# Patient Record
Sex: FEMALE | Race: WHITE | NOT HISPANIC OR LATINO | Employment: STUDENT | URBAN - NONMETROPOLITAN AREA
[De-identification: names, ages, dates, MRNs, and addresses within clinical notes are randomized per-mention and may not be internally consistent; named-entity substitution may affect disease eponyms.]

---
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
## 2023-01-13 ENCOUNTER — E-VISIT (OUTPATIENT)
Dept: URGENT CARE | Facility: CLINIC | Age: 18
End: 2023-01-13
Payer: COMMERCIAL

## 2023-01-13 DIAGNOSIS — N89.8 VAGINAL DISCHARGE: Primary | ICD-10-CM

## 2023-01-13 PROCEDURE — 99207 PR NON-BILLABLE SERV PER CHARTING: CPT | Performed by: NURSE PRACTITIONER

## 2023-01-14 NOTE — PATIENT INSTRUCTIONS
Thank you for choosing us for your care. I think an in-clinic visit would be best next steps based on your symptoms. Please schedule a clinic appointment; you won t be charged for this eVisit.      You can schedule an appointment right here in [place], or call [phone]      Please see your GYN for bleeding with intercourse.

## 2023-01-26 ENCOUNTER — HOSPITAL ENCOUNTER (OUTPATIENT)
Dept: PHYSICAL THERAPY | Facility: OTHER | Age: 18
Setting detail: THERAPIES SERIES
Discharge: HOME | End: 2023-01-26
Attending: FAMILY MEDICINE
Payer: COMMERCIAL

## 2023-01-26 DIAGNOSIS — M70.72 ILIOPSOAS BURSITIS OF LEFT HIP: ICD-10-CM

## 2023-01-26 DIAGNOSIS — M67.952 TENDINOPATHY OF LEFT GLUTEUS MEDIUS: ICD-10-CM

## 2023-01-26 DIAGNOSIS — M47.818 ARTHROPATHY OF LEFT SACROILIAC JOINT: ICD-10-CM

## 2023-01-26 DIAGNOSIS — M76.892 TENDINITIS OF LEFT HIP FLEXOR: ICD-10-CM

## 2023-01-26 PROCEDURE — 97110 THERAPEUTIC EXERCISES: CPT | Mod: GP,PN

## 2023-01-26 PROCEDURE — 97161 PT EVAL LOW COMPLEX 20 MIN: CPT | Mod: GP,PN

## 2023-01-26 PROCEDURE — 97140 MANUAL THERAPY 1/> REGIONS: CPT | Mod: GP,PN

## 2023-01-27 NOTE — PROGRESS NOTES
"   01/26/23 1000   General Information   Type of Visit Initial OP Ortho PT Evaluation   Start of Care Date 01/26/23   Referring Physician Dr. José Luis Tyson   Orders Evaluate and Treat   Date of Order 12/14/22   Certification Required? No   Medical Diagnosis Left hip pain, L SI Joint dysfunction   Surgical/Medical history reviewed Yes   Precautions/Limitations no known precautions/limitations   Special Instructions None   Body Part(s)   Body Part(s) Hip   Presentation and Etiology   Pertinent history of current problem (include personal factors and/or comorbidities that impact the POC) Patient is a 18 y/o female whom presents to PT with c/o chronic left lateral hip pain. Reports her left hip pain has actually increased since her appointment with Dr. Tyson. Reports almost constant \"aching\".Reports episodes of sharper lateral to anterior hip pain. Rare cramping left posterior hip. No recent choropractic treatment. She works 40 hours per week as a  standing on hard floors. her hip is always more painful after her shifts. She is generally quite active and is also a full time college student. No other significant past medical history.   Impairments A. Pain;F. Decreased strength and endurance   Functional Limitations perform required work activities;perform desired leisure / sports activities   Symptom Location Left lateral to anterior hip   How/Where did it occur From insidious onset   Onset date of current episode/exacerbation 12/14/22  (Date of referral)   Chronicity Chronic   Pain rating (0-10 point scale) Best (/10);Worst (/10)   Best (/10) 1   Worst (/10) 8   Pain quality C. Aching;E. Shooting   Frequency of pain/symptoms A. Constant   Pain/symptoms are: Worse during the day   Pain/symptoms exacerbated by L. Work tasks   Pain/symptoms eased by C. Rest;I. OTC medication(s);H. Cold  (Tylenol)   Progression of symptoms since onset: Worsened   Prior Level of Function   Prior Level of Function-Mobility NML   Prior " Level of Function-ADLs NML   Current Level of Function   Current Community Support Family/friend caregiver   Patient role/employment history A. Employed;B. Student   Living environment House/townhome   Home/community accessibility Normal   Fall Risk Screen   Fall screen completed by PT   Have you fallen 2 or more times in the past year? No   Have you fallen and had an injury in the past year? No   Is patient a fall risk? No   Abuse Screen (yes response referral indicated)   Feels Unsafe at Home or Work/School no   Feels Threatened by Someone no   Does Anyone Try to Keep You From Having Contact with Others or Doing Things Outside Your Home? no   Physical Signs of Abuse Present no   Hip Objective Findings   Gait/Locomotion Appears to be nonantalgic and without impairment   Balance/Proprioception (Single Leg Stance) Good bilaterally   Side (if bilateral, select both right and left) Left   Hip Flexibility Comments Generally good bilaterally   Hip ROM Comments Hip range of motion is generally without pain with exception of hip flexion above the 100 degree level and at end range of internal rotation.  Both of these motions cause discomfort in the anterior hip   Lumbar ROM Normal   Pelvic Screen Good alignment is seen   Functional Closed Chain Screen Patient is able to complete full squat test without pain   Hip/Knee Strength Comments Generally excellent strength in the lower extremities bilaterally   Femoral Nerve Stretch Test Negative   Gluteal Tendopathy Test Positive left   Resisted Hip Adduction Test Negative   Straight Leg Raise Test NEG   Scour Test NEG   BLAS Test Mild POS   FADIR Test Mild POS   Hip Special Tests Comments Mild positive results of tests included in the gluteal tendinopathy complex   Palpation 2/4 tenderness of left glute med and IT band.   Observation No observed deformity   Integumentary  Normal   Posture Normal   Reginald Flexibility Test Normal bilaterally   Obers/ITB Flexibility Slight  discomfort along the IT band and lateral hip on the left   Left Hamstring Flexibility Generally good and equal bilaterally   Left Piriformis Flexibility Good range of motion with minimal to moderate discomfort with full endrange stretch on the left   Left Hip Flexion PROM 110   Left Hip Abduction PROM 44   Left Hip Adduction PROM 38   Left Hip ER PROM 42   Left Hip IR PROM 37   Left Hip Ext PROM 12   Left Hip Flexion Strength 4+ left 5 on the right   Left Hip Abduction Strength 5/5 bilaterally   Left Hip Extension Strength 5/5 bilaterally   Left Hip IR Strength 5/5 bilaterally   Left Hip ER Strength 4+ left, 5/5 right   Left Knee Flexion Strength 5/5 bilaterally   Left Knee Extension Strength 5/5 bilaterally   Planned Therapy Interventions   Planned Therapy Interventions joint mobilization;manual therapy;ROM;strengthening;stretching   Planned Modality Interventions   Planned Modality Interventions Cryotherapy;Hot packs;Ultrasound   Planned Modality Interventions Comments Modalities will be used as an adjunct to the intended course of exercise based on manual physical therapy treatment   Clinical Impression   Criteria for Skilled Therapeutic Interventions Met yes, treatment indicated   PT Diagnosis Left hip and gluteal tendinopathy   Influenced by the following impairments Left hip pain, intermittent episodes of left lower extremity weakness and limited range of motion   Functional limitations due to impairments Difficulty with activities which require longer duration standing and/or walking including working for up to 40 hours/week.  At times difficulty laying on the left side.  Reduced endurance for normal daily activities due to left hip pain   Clinical Presentation Stable/Uncomplicated   Clinical Decision Making (Complexity) Low complexity   Therapy Frequency 2 times/Week  (Will decrease to 1 time per week then transition completely to an independent home exercise program as patient's left hip pain resolves)    Predicted Duration of Therapy Intervention (days/wks) 8 weeks   Risk & Benefits of therapy have been explained Yes   Patient, Family & other staff in agreement with plan of care Yes   Clinical Impression Comments Patient generally demonstrates good lower extremity strength and essentially normal range of motion   Education Assessment   Preferred Learning Style Listening;Reading;Demonstration;Pictures/video   Barriers to Learning No barriers   ORTHO GOALS   PT Ortho Eval Goals 1;2;3   Ortho Goal 1   Goal Identifier Pain   Goal Description Patient will report she is able to complete 8 hour/day work shifts up to 40 hours/week without limitation due to left hip pain in excess of 2/10 at any time.  Patient will be able to complete other recreational and desired pursuits including longer duration walking etc. as desired without limitation due to left hip pain in 8-12 weeks   Target Date 04/12/23   Ortho Goal 2   Goal Identifier Strength   Goal Description Patient will demonstrate grade 5/5 MMT strength throughout the entire left hip knee and ankle muscle group.  This will support her ability to complete all normal job tasks up to 40 hours/week 8 hours/day without limitation.  She will demo good stability throughout the hip complex for all activities including walking and other recreational pursuits as desired and 8 to 12 weeks   Target Date 04/12/23   Ortho Goal 3   Goal Identifier Mobility   Goal Description Patient will demonstrate full left hip mobility in all positions without limitation to endrange discomfort.  This will support the mobility required to complete all job tasks including lifting carrying weights up to 20 to 30 pounds, bending into squat positions and lifting stocking shelves and other tasks as required at her job and at home in 8 to 12 weeks   Target Date 04/12/23   Total Evaluation Time   PT Eval, Low Complexity Minutes (29184) 35

## 2023-01-31 ENCOUNTER — HOSPITAL ENCOUNTER (OUTPATIENT)
Dept: PHYSICAL THERAPY | Facility: OTHER | Age: 18
Setting detail: THERAPIES SERIES
Discharge: HOME | End: 2023-01-31
Attending: FAMILY MEDICINE
Payer: COMMERCIAL

## 2023-01-31 PROCEDURE — 97140 MANUAL THERAPY 1/> REGIONS: CPT | Mod: GP,PN

## 2023-01-31 PROCEDURE — 97110 THERAPEUTIC EXERCISES: CPT | Mod: GP,PN

## 2023-02-02 ENCOUNTER — HOSPITAL ENCOUNTER (OUTPATIENT)
Dept: PHYSICAL THERAPY | Facility: OTHER | Age: 18
Setting detail: THERAPIES SERIES
Discharge: HOME | End: 2023-02-02
Attending: FAMILY MEDICINE
Payer: COMMERCIAL

## 2023-02-02 PROCEDURE — 97140 MANUAL THERAPY 1/> REGIONS: CPT | Mod: GP,PN

## 2023-02-02 PROCEDURE — 97110 THERAPEUTIC EXERCISES: CPT | Mod: GP,PN

## 2023-02-07 ENCOUNTER — HOSPITAL ENCOUNTER (OUTPATIENT)
Dept: PHYSICAL THERAPY | Facility: OTHER | Age: 18
Setting detail: THERAPIES SERIES
Discharge: HOME | End: 2023-02-07
Attending: FAMILY MEDICINE
Payer: COMMERCIAL

## 2023-02-07 PROCEDURE — 97110 THERAPEUTIC EXERCISES: CPT | Mod: GP

## 2023-02-07 PROCEDURE — 97140 MANUAL THERAPY 1/> REGIONS: CPT | Mod: GP

## 2023-02-09 ENCOUNTER — HOSPITAL ENCOUNTER (OUTPATIENT)
Dept: PHYSICAL THERAPY | Facility: OTHER | Age: 18
Setting detail: THERAPIES SERIES
Discharge: HOME | End: 2023-02-09
Attending: FAMILY MEDICINE
Payer: COMMERCIAL

## 2023-02-09 PROCEDURE — 97110 THERAPEUTIC EXERCISES: CPT | Mod: GP,PN

## 2023-02-09 PROCEDURE — 97140 MANUAL THERAPY 1/> REGIONS: CPT | Mod: GP,PN

## 2023-02-20 ENCOUNTER — HOSPITAL ENCOUNTER (OUTPATIENT)
Dept: PHYSICAL THERAPY | Facility: OTHER | Age: 18
Setting detail: THERAPIES SERIES
Discharge: HOME | End: 2023-02-20
Attending: FAMILY MEDICINE
Payer: COMMERCIAL

## 2023-02-20 PROCEDURE — 97140 MANUAL THERAPY 1/> REGIONS: CPT | Mod: GP,PO

## 2023-02-24 ENCOUNTER — HOSPITAL ENCOUNTER (OUTPATIENT)
Dept: PHYSICAL THERAPY | Facility: OTHER | Age: 18
Setting detail: THERAPIES SERIES
Discharge: HOME | End: 2023-02-24
Attending: FAMILY MEDICINE
Payer: COMMERCIAL

## 2023-02-24 PROCEDURE — 97140 MANUAL THERAPY 1/> REGIONS: CPT | Mod: GP,PO

## 2023-02-24 PROCEDURE — 97110 THERAPEUTIC EXERCISES: CPT | Mod: GP,PO

## 2023-03-08 ENCOUNTER — OFFICE VISIT (OUTPATIENT)
Dept: FAMILY MEDICINE | Facility: OTHER | Age: 18
End: 2023-03-08
Attending: PHYSICIAN ASSISTANT
Payer: COMMERCIAL

## 2023-03-08 VITALS
WEIGHT: 117 LBS | TEMPERATURE: 97.7 F | DIASTOLIC BLOOD PRESSURE: 72 MMHG | RESPIRATION RATE: 16 BRPM | BODY MASS INDEX: 23.83 KG/M2 | OXYGEN SATURATION: 99 % | HEART RATE: 80 BPM | SYSTOLIC BLOOD PRESSURE: 100 MMHG

## 2023-03-08 DIAGNOSIS — N89.8 VAGINAL DISCHARGE: ICD-10-CM

## 2023-03-08 DIAGNOSIS — Z11.3 SCREEN FOR STD (SEXUALLY TRANSMITTED DISEASE): ICD-10-CM

## 2023-03-08 DIAGNOSIS — A74.9 CHLAMYDIA INFECTION: ICD-10-CM

## 2023-03-08 DIAGNOSIS — N94.10 PAIN IN FEMALE GENITALIA ON INTERCOURSE: ICD-10-CM

## 2023-03-08 DIAGNOSIS — R30.0 DYSURIA: Primary | ICD-10-CM

## 2023-03-08 DIAGNOSIS — B37.31 YEAST INFECTION OF THE VAGINA: Primary | ICD-10-CM

## 2023-03-08 DIAGNOSIS — R10.84 ABDOMINAL PAIN, GENERALIZED: ICD-10-CM

## 2023-03-08 DIAGNOSIS — Z11.4 SCREENING FOR HIV (HUMAN IMMUNODEFICIENCY VIRUS): ICD-10-CM

## 2023-03-08 DIAGNOSIS — Z11.59 ENCOUNTER FOR HEPATITIS C SCREENING TEST FOR LOW RISK PATIENT: ICD-10-CM

## 2023-03-08 DIAGNOSIS — R11.0 NAUSEA: ICD-10-CM

## 2023-03-08 LAB
ALBUMIN SERPL BCG-MCNC: 4.8 G/DL (ref 3.5–5.2)
ALBUMIN UR-MCNC: NEGATIVE MG/DL
ALP SERPL-CCNC: 66 U/L (ref 35–104)
ALT SERPL W P-5'-P-CCNC: 18 U/L (ref 10–35)
ANION GAP SERPL CALCULATED.3IONS-SCNC: 9 MMOL/L (ref 7–15)
APPEARANCE UR: CLEAR
AST SERPL W P-5'-P-CCNC: 14 U/L (ref 10–35)
BASOPHILS # BLD AUTO: 0 10E3/UL (ref 0–0.2)
BASOPHILS NFR BLD AUTO: 1 %
BILIRUB SERPL-MCNC: 0.3 MG/DL
BILIRUB UR QL STRIP: NEGATIVE
BUN SERPL-MCNC: 12.3 MG/DL (ref 6–20)
C TRACH DNA SPEC QL PROBE+SIG AMP: POSITIVE
CALCIUM SERPL-MCNC: 9.9 MG/DL (ref 8.6–10)
CHLORIDE SERPL-SCNC: 106 MMOL/L (ref 98–107)
CLUE CELLS: ABNORMAL
COLOR UR AUTO: NORMAL
CREAT SERPL-MCNC: 0.78 MG/DL (ref 0.51–0.95)
DEPRECATED HCO3 PLAS-SCNC: 30 MMOL/L (ref 22–29)
EOSINOPHIL # BLD AUTO: 0.1 10E3/UL (ref 0–0.7)
EOSINOPHIL NFR BLD AUTO: 2 %
ERYTHROCYTE [DISTWIDTH] IN BLOOD BY AUTOMATED COUNT: 12.6 % (ref 10–15)
GFR SERPL CREATININE-BSD FRML MDRD: >90 ML/MIN/1.73M2
GLUCOSE SERPL-MCNC: 87 MG/DL (ref 70–99)
GLUCOSE UR STRIP-MCNC: NEGATIVE MG/DL
HCG UR QL: NEGATIVE
HCT VFR BLD AUTO: 41.2 % (ref 35–47)
HGB BLD-MCNC: 14.5 G/DL (ref 11.7–15.7)
HGB UR QL STRIP: NEGATIVE
IMM GRANULOCYTES # BLD: 0 10E3/UL
IMM GRANULOCYTES NFR BLD: 0 %
KETONES UR STRIP-MCNC: NEGATIVE MG/DL
LEUKOCYTE ESTERASE UR QL STRIP: NEGATIVE
LYMPHOCYTES # BLD AUTO: 1.6 10E3/UL (ref 0.8–5.3)
LYMPHOCYTES NFR BLD AUTO: 21 %
MCH RBC QN AUTO: 31.2 PG (ref 26.5–33)
MCHC RBC AUTO-ENTMCNC: 35.2 G/DL (ref 31.5–36.5)
MCV RBC AUTO: 89 FL (ref 78–100)
MONOCYTES # BLD AUTO: 0.4 10E3/UL (ref 0–1.3)
MONOCYTES NFR BLD AUTO: 5 %
N GONORRHOEA DNA SPEC QL NAA+PROBE: NEGATIVE
NEUTROPHILS # BLD AUTO: 5.6 10E3/UL (ref 1.6–8.3)
NEUTROPHILS NFR BLD AUTO: 71 %
NITRATE UR QL: NEGATIVE
NRBC # BLD AUTO: 0 10E3/UL
NRBC BLD AUTO-RTO: 0 /100
PH UR STRIP: 6.5 [PH] (ref 5–9)
PLATELET # BLD AUTO: 304 10E3/UL (ref 150–450)
POTASSIUM SERPL-SCNC: 4.1 MMOL/L (ref 3.4–5.3)
PROT SERPL-MCNC: 7.6 G/DL (ref 6.4–8.3)
RBC # BLD AUTO: 4.65 10E6/UL (ref 3.8–5.2)
SODIUM SERPL-SCNC: 145 MMOL/L (ref 136–145)
SP GR UR STRIP: 1.02 (ref 1–1.03)
TRICHOMONAS, WET PREP: ABNORMAL
UROBILINOGEN UR STRIP-MCNC: NORMAL MG/DL
WBC # BLD AUTO: 7.9 10E3/UL (ref 4–11)
WBC'S/HIGH POWER FIELD, WET PREP: ABNORMAL
YEAST, WET PREP: PRESENT

## 2023-03-08 PROCEDURE — 87591 N.GONORRHOEAE DNA AMP PROB: CPT | Mod: ZL | Performed by: PHYSICIAN ASSISTANT

## 2023-03-08 PROCEDURE — 81025 URINE PREGNANCY TEST: CPT | Mod: ZL | Performed by: PHYSICIAN ASSISTANT

## 2023-03-08 PROCEDURE — 86803 HEPATITIS C AB TEST: CPT | Mod: ZL | Performed by: PHYSICIAN ASSISTANT

## 2023-03-08 PROCEDURE — 81003 URINALYSIS AUTO W/O SCOPE: CPT | Mod: ZL | Performed by: PHYSICIAN ASSISTANT

## 2023-03-08 PROCEDURE — 87491 CHLMYD TRACH DNA AMP PROBE: CPT | Mod: ZL | Performed by: PHYSICIAN ASSISTANT

## 2023-03-08 PROCEDURE — 87210 SMEAR WET MOUNT SALINE/INK: CPT | Mod: ZL | Performed by: PHYSICIAN ASSISTANT

## 2023-03-08 PROCEDURE — 85041 AUTOMATED RBC COUNT: CPT | Mod: ZL | Performed by: PHYSICIAN ASSISTANT

## 2023-03-08 PROCEDURE — 36415 COLL VENOUS BLD VENIPUNCTURE: CPT | Mod: ZL | Performed by: PHYSICIAN ASSISTANT

## 2023-03-08 PROCEDURE — 80053 COMPREHEN METABOLIC PANEL: CPT | Mod: ZL | Performed by: PHYSICIAN ASSISTANT

## 2023-03-08 PROCEDURE — 99214 OFFICE O/P EST MOD 30 MIN: CPT | Performed by: PHYSICIAN ASSISTANT

## 2023-03-08 PROCEDURE — 87389 HIV-1 AG W/HIV-1&-2 AB AG IA: CPT | Mod: ZL | Performed by: PHYSICIAN ASSISTANT

## 2023-03-08 RX ORDER — AZITHROMYCIN 500 MG/1
1000 TABLET, FILM COATED ORAL DAILY
Qty: 2 TABLET | Refills: 0 | Status: SHIPPED | OUTPATIENT
Start: 2023-03-08 | End: 2023-03-09

## 2023-03-08 RX ORDER — FLUCONAZOLE 150 MG/1
150 TABLET ORAL ONCE
Qty: 2 TABLET | Refills: 0 | Status: SHIPPED | OUTPATIENT
Start: 2023-03-08 | End: 2023-03-08

## 2023-03-08 ASSESSMENT — ASTHMA QUESTIONNAIRES
ACT_TOTALSCORE: 22
QUESTION_5 LAST FOUR WEEKS HOW WOULD YOU RATE YOUR ASTHMA CONTROL: SOMEWHAT CONTROLLED
QUESTION_3 LAST FOUR WEEKS HOW OFTEN DID YOUR ASTHMA SYMPTOMS (WHEEZING, COUGHING, SHORTNESS OF BREATH, CHEST TIGHTNESS OR PAIN) WAKE YOU UP AT NIGHT OR EARLIER THAN USUAL IN THE MORNING: NOT AT ALL
ACT_TOTALSCORE: 22
QUESTION_2 LAST FOUR WEEKS HOW OFTEN HAVE YOU HAD SHORTNESS OF BREATH: ONCE OR TWICE A WEEK
QUESTION_4 LAST FOUR WEEKS HOW OFTEN HAVE YOU USED YOUR RESCUE INHALER OR NEBULIZER MEDICATION (SUCH AS ALBUTEROL): NOT AT ALL
QUESTION_1 LAST FOUR WEEKS HOW MUCH OF THE TIME DID YOUR ASTHMA KEEP YOU FROM GETTING AS MUCH DONE AT WORK, SCHOOL OR AT HOME: NONE OF THE TIME

## 2023-03-08 ASSESSMENT — PAIN SCALES - GENERAL: PAINLEVEL: NO PAIN (0)

## 2023-03-08 NOTE — LETTER
My Asthma Action Plan    Name: Lilliana Viera   YOB: 2005  Date: 3/8/2023   My doctor: Sherrell Powell PA-C   My clinic: [facility]        My Rescue Medicine:   Albuterol inhaler (Proair/Ventolin/Proventil HFA)  2-4 puffs EVERY 4 HOURS as needed. Use a spacer if recommended by your provider.   My Asthma Severity:   Intermittent / Exercise Induced  Know your asthma triggers: exercise or sports             GREEN ZONE   Good Control    I feel good    No cough or wheeze    Can work, sleep and play without asthma symptoms       Take your asthma control medicine every day.     1. If exercise triggers your asthma, take your rescue medication    15 minutes before exercise or sports, and    During exercise if you have asthma symptoms  2. Spacer to use with inhaler: If you have a spacer, make sure to use it with your inhaler             YELLOW ZONE Getting Worse  I have ANY of these:    I do not feel good    Cough or wheeze    Chest feels tight    Wake up at night   1. Keep taking your Green Zone medications  2. Start taking your rescue medicine:    every 20 minutes for up to 1 hour. Then every 4 hours for 24-48 hours.  3. If you stay in the Yellow Zone for more than 12-24 hours, contact your doctor.  4. If you do not return to the Green Zone in 12-24 hours or you get worse, start taking your oral steroid medicine if prescribed by your provider.           RED ZONE Medical Alert - Get Help  I have ANY of these:    I feel awful    Medicine is not helping    Breathing getting harder    Trouble walking or talking    Nose opens wide to breathe       1. Take your rescue medicine NOW  2. If your provider has prescribed an oral steroid medicine, start taking it NOW  3. Call your doctor NOW  4. If you are still in the Red Zone after 20 minutes and you have not reached your doctor:    Take your rescue medicine again and    Call 911 or go to the emergency room right away    See your regular doctor  within 2 weeks of an Emergency Room or Urgent Care visit for follow-up treatment.          Annual Reminders:  Meet with Asthma Educator,  Flu Shot in the Fall, consider Pneumonia Vaccination for patients with asthma (aged 19 and older).    Pharmacy: [facility] [address] [place] [address] [address]    Electronically signed by Sherrell Powell PA-C   Date: 03/08/23                    Asthma Triggers  How To Control Things That Make Your Asthma Worse    Triggers are things that make your asthma worse.  Look at the list below to help you find your triggers and   what you can do about them. You can help prevent asthma flare-ups by staying away from your triggers.      Trigger                                                          What you can do   Cigarette Smoke  Tobacco smoke can make asthma worse. Do not allow smoking in your home, car or around you.  Be sure no one smokes at a child s day care or school.  If you smoke, ask your health care provider for ways to help you quit.  Ask family members to quit too.  Ask your health care provider for a referral to Quit Plan to help you quit smoking, or call 6-603-009-PLAN.     Colds, Flu, Bronchitis  These are common triggers of asthma. Wash your hands often.  Don t touch your eyes, nose or mouth.  Get a flu shot every year.     Dust Mites  These are tiny bugs that live in cloth or carpet. They are too small to see. Wash sheets and blankets in hot water every week.   Encase pillows and mattress in dust mite proof covers.  Avoid having carpet if you can. If you have carpet, vacuum weekly.   Use a dust mask and HEPA vacuum.   Pollen and Outdoor Mold  Some people are allergic to trees, grass, or weed pollen, or molds. Try to keep your windows closed.  Limit time out doors when pollen count is high.   Ask you health care provider about taking medicine during allergy season.     Animal Dander  Some people are allergic to skin flakes, urine or saliva from pets with  fur or feathers. Keep pets with fur or feathers out of your home.    If you can t keep the pet outdoors, then keep the pet out of your bedroom.  Keep the bedroom door closed.  Keep pets off cloth furniture and away from stuffed toys.     Mice, Rats, and Cockroaches  Some people are allergic to the waste from these pests.   Cover food and garbage.  Clean up spills and food crumbs.  Store grease in the refrigerator.   Keep food out of the bedroom.   Indoor Mold  This can be a trigger if your home has high moisture. Fix leaking faucets, pipes, or other sources of water.   Clean moldy surfaces.  Dehumidify basement if it is damp and smelly.   Smoke, Strong Odors, and Sprays  These can reduce air quality. Stay away from strong odors and sprays, such as perfume, powder, hair spray, paints, smoke incense, paint, cleaning products, candles and new carpet.   Exercise or Sports  Some people with asthma have this trigger. Be active!  Ask your doctor about taking medicine before sports or exercise to prevent symptoms.    Warm up for 5-10 minutes before and after sports or exercise.     Other Triggers of Asthma  Cold air:  Cover your nose and mouth with a scarf.  Sometimes laughing or crying can be a trigger.  Some medicines and food can trigger asthma.

## 2023-03-08 NOTE — NURSING NOTE
Pt presents to clinic today for pain during and after intercourse and general abdominal pain. Has been ongoing for the last coupe months.       FOOD SECURITY SCREENING QUESTIONS:    The next two questions are to help us understand your food security.  If you are feeling you need any assistance in this area, we have resources available to support you today.    Hunger Vital Signs:  Within the past 12 months we worried whether our food would run out before we got money to buy more. Never  Within the past 12 months the food we bought just didn't last and we didn't have money to get more. Never          Medication Reconciliation: Trung Oleary LPN,LPN on 3/8/2023 at 8:58 AM

## 2023-03-08 NOTE — PROGRESS NOTES
Assessment & Plan   Problem List Items Addressed This Visit    None  Visit Diagnoses     Dysuria    -  Primary    Relevant Orders    UA reflex to Microscopic    Pregnancy, Urine (HCG)    CBC and Differential    Comprehensive Metabolic Panel    Vaginal discharge        Relevant Orders    GC/Chlamydia by PCR    Wet Prep, Genital    Pregnancy, Urine (HCG)    Screen for STD (sexually transmitted disease)        Relevant Orders    GC/Chlamydia by PCR    Wet Prep, Genital    Pregnancy, Urine (HCG)    Abdominal pain, generalized        Relevant Orders    CBC and Differential    Comprehensive Metabolic Panel    Nausea        Relevant Orders    CBC and Differential    Comprehensive Metabolic Panel    Encounter for hepatitis C screening test for low risk patient        Relevant Orders    Hepatitis C Screen Reflex to HCV RNA Quant and Genotype    Screening for HIV (human immunodeficiency virus)        Relevant Orders    HIV Antigen Antibody Combo    Pain in female genitalia on intercourse             Completed low risk HIV and hepatitis C screen.    Nausea, generalized abdominal pain, vaginal discharge, and dysuria: Completed a thorough lab work-up to rule out concerns including CBC, CMP, UPT, urinalysis.  Also completed STD screen including vaginal wet prep, gonorrhea, and chlamydia.  Labs are pending.  Encourage closely monitoring symptoms.  If symptoms or not improving or worsening may need to refer to OB/GYN for a consult.    Discussed eating a bland diet.  Encouraged to eat yogurt daily.  Can use Tums or Pepcid as needed.  Recheck as needed.    Ordering of each unique test  30 minutes spent on the date of the encounter doing chart review, history and exam, documentation and further activities per the note       See Patient Instructions    No follow-ups on file.    Sherrell Powell PA-C  [facility] AND [facility]    Michelle Tijerina is a 17 year old, presenting for the following health issues:  Abdominal  Pain      History of Present Illness       Reason for visit:  Discomfort in lowrt abdonen, discomfort during/after intercourse, abnormal discharge.    She eats 0-1 servings of fruits and vegetables daily.She consumes 1 sweetened beverage(s) daily.She exercises with enough effort to increase her heart rate 9 or less minutes per day.  She exercises with enough effort to increase her heart rate 5 days per week. She is missing 2 dose(s) of medications per week.  She is not taking prescribed medications regularly due to remembering to take.    Today's PHQ-9         PHQ-9 Total Score: 6    PHQ-9 Q9 Thoughts of better off dead/self-harm past 2 weeks :   Not at all    How difficult have these problems made it for you to do your work, take care of things at home, or get along with other people: Somewhat difficult       Pt presents to clinic today for pain during and after intercourse and general abdominal pain. Has been ongoing for the last coupe months.     Patient has had discomfort in her mid to lower abdomen over the last month.  Worse with eating.  Gets a bad stomachache when she eats.  Does not matter with the type of food.  Takes approximately 20 minutes to come on.  Feels nauseous however she does not vomit.  Has had a little constipation diarrhea.  Decreased appetite with the pain.  Mild discomfort.  Little lower back pain.  Mild dysuria.  No frequency urgency.  Little heartburn.    She has trouble with some vaginal discharge over the last month as well.  Has an IUD.  Has not tried feeling her strings.  Some pain with and after intercourse.  Had a little spotting after intercourse.  No other abnormal vaginal bleeding.  No STD or pregnancy concerns.  Has had some yellow-green discharge that comes and goes.    Due for low risk HIV and hepatitis C screen.    Review of Systems   Constitutional, HEENT, cardiovascular, pulmonary, gi and gu systems are negative, except as otherwise noted.      Objective    /72 (BP  Location: Right arm, Patient Position: Sitting, Cuff Size: Adult Regular)   Pulse 80   Temp 97.7  F (36.5  C) (Tympanic)   Resp 16   Wt 53.1 kg (117 lb)   SpO2 99%   BMI 23.83 kg/m    Body mass index is 23.83 kg/m .  Physical Exam  Vitals and nursing note reviewed.   Constitutional:       General: She is not in acute distress.     Appearance: Normal appearance. She is well-developed.   Cardiovascular:      Rate and Rhythm: Normal rate and regular rhythm.      Heart sounds: Normal heart sounds, S1 normal and S2 normal.   Pulmonary:      Effort: Pulmonary effort is normal. No respiratory distress.      Breath sounds: Normal breath sounds. No wheezing or rales.   Abdominal:      General: Abdomen is flat. Bowel sounds are normal.      Palpations: Abdomen is soft. There is no mass.      Tenderness: There is no right CVA tenderness, left CVA tenderness, guarding or rebound.      Comments: Minimal pain with palpation of the left lower quadrant and right lower quadrant.   Genitourinary:     General: Normal vulva.      Labia:         Right: No rash or tenderness.         Left: No rash or tenderness.       Vagina: Vaginal discharge present.      Cervix: No cervical motion tenderness or discharge.      Uterus: Normal. Not tender.       Adnexa: Right adnexa normal and left adnexa normal.        Right: No tenderness.          Left: No tenderness.        Comments: Mild amount of white vaginal discharge appreciated.  IUD strings appreciated.  Musculoskeletal:         General: Normal range of motion.      Cervical back: Normal range of motion.   Skin:     General: Skin is warm and dry.      Findings: No rash.   Neurological:      Mental Status: She is alert and oriented to person, place, and time.      Motor: No abnormal muscle tone.      Deep Tendon Reflexes: Reflexes are normal and symmetric.   Psychiatric:         Mood and Affect: Mood normal.         Behavior: Behavior normal.

## 2023-03-09 ENCOUNTER — TELEPHONE (OUTPATIENT)
Dept: FAMILY MEDICINE | Facility: OTHER | Age: 18
End: 2023-03-09
Payer: COMMERCIAL

## 2023-03-09 LAB
HCV AB SERPL QL IA: NONREACTIVE
HIV 1+2 AB+HIV1 P24 AG SERPL QL IA: NONREACTIVE

## 2023-03-09 NOTE — TELEPHONE ENCOUNTER
Notified patient, charted on results note. Closing this encounter.  ..Trung Santiago LPN on 3/9/2023 at 11:18 AM

## 2023-03-17 ENCOUNTER — OFFICE VISIT (OUTPATIENT)
Dept: FAMILY MEDICINE | Facility: OTHER | Age: 18
End: 2023-03-17
Attending: PHYSICIAN ASSISTANT
Payer: COMMERCIAL

## 2023-03-17 VITALS
RESPIRATION RATE: 16 BRPM | TEMPERATURE: 98.3 F | DIASTOLIC BLOOD PRESSURE: 74 MMHG | SYSTOLIC BLOOD PRESSURE: 110 MMHG | BODY MASS INDEX: 24.04 KG/M2 | WEIGHT: 118 LBS | OXYGEN SATURATION: 100 % | HEART RATE: 94 BPM

## 2023-03-17 DIAGNOSIS — Z86.19 HISTORY OF CHLAMYDIA: Primary | ICD-10-CM

## 2023-03-17 PROCEDURE — 99212 OFFICE O/P EST SF 10 MIN: CPT | Performed by: PHYSICIAN ASSISTANT

## 2023-03-17 ASSESSMENT — PAIN SCALES - GENERAL: PAINLEVEL: NO PAIN (0)

## 2023-03-17 NOTE — NURSING NOTE
Pt presents to clinic today for A retest and follow up from last weeks appointment.     Pt states she is not having any vaginal symptoms anymore and feels a lot better.   FOOD SECURITY SCREENING QUESTIONS:    The next two questions are to help us understand your food security.  If you are feeling you need any assistance in this area, we have resources available to support you today.    Hunger Vital Signs:  Within the past 12 months we worried whether our food would run out before we got money to buy more. Never  Within the past 12 months the food we bought just didn't last and we didn't have money to get more. Never          Medication Reconciliation: complete  Trung Santiago LPN,LPN on 3/17/2023 at 1:29 PM

## 2023-03-17 NOTE — PROGRESS NOTES
Assessment & Plan   Problem List Items Addressed This Visit    None  Visit Diagnoses     History of chlamydia    -  Primary         Discussed symptoms and concerns at length.  Patient completed the full course of the chlamydia and yeast treatment.  Feeling better.  Patient would like repeat testing. Too soon to retest today.  We will need to test for cure after 4/5/2023.  Encouraged to set up a clinic appointment at that time or afterwards.  Return sooner as needed.       See Patient Instructions    Return if symptoms worsen or fail to improve.    Sherrell Powell PA-C  [facility]    Michelle Tijerina is a 17 year old, presenting for the following health issues:  Follow Up (Last weeks appt, retest )      HPI       Follow up of STD test results, retest     Patient is here for follow-up testing.  Patient tested positive for chlamydia and vaginal yeast infection 1 week ago.  Took both treatments.  Tolerated the medication well. No side effects noted.  Patient states that she is feeling better.  Currently doing well with eating.  Stomach is no longer irritated with food.  No further nausea.  No abdominal pain or back pain.  Feels much better.  No longer with the previous partner.    Review of Systems   Constitutional, HEENT, cardiovascular, pulmonary, gi and gu systems are negative, except as otherwise noted.      Objective    /74 (BP Location: Right arm, Patient Position: Sitting, Cuff Size: Adult Regular)   Pulse 94   Temp 98.3  F (36.8  C) (Tympanic)   Resp 16   Wt 53.5 kg (118 lb)   SpO2 100%   BMI 24.04 kg/m    Body mass index is 24.04 kg/m .  Physical Exam  Vitals and nursing note reviewed.   Constitutional:       General: She is not in acute distress.     Appearance: Normal appearance. She is well-developed.   Cardiovascular:      Rate and Rhythm: Normal rate and regular rhythm.      Heart sounds: Normal heart sounds, S1 normal and S2 normal. No murmur heard.  Pulmonary:       Effort: Pulmonary effort is normal. No respiratory distress.      Breath sounds: Normal breath sounds. No wheezing or rales.   Abdominal:      General: Abdomen is flat. Bowel sounds are normal.      Palpations: Abdomen is soft. There is no mass.      Tenderness: There is no abdominal tenderness. There is no guarding or rebound.      Hernia: No hernia is present.   Musculoskeletal:         General: Normal range of motion.   Skin:     General: Skin is warm and dry.      Findings: No rash.   Neurological:      General: No focal deficit present.      Mental Status: She is alert and oriented to person, place, and time.   Psychiatric:         Mood and Affect: Mood normal.         Behavior: Behavior normal.         Thought Content: Thought content normal.         Judgment: Judgment normal.

## 2023-04-21 ENCOUNTER — OFFICE VISIT (OUTPATIENT)
Dept: FAMILY MEDICINE | Facility: OTHER | Age: 18
End: 2023-04-21
Attending: PHYSICIAN ASSISTANT
Payer: COMMERCIAL

## 2023-04-21 VITALS
OXYGEN SATURATION: 98 % | RESPIRATION RATE: 18 BRPM | WEIGHT: 119.6 LBS | DIASTOLIC BLOOD PRESSURE: 80 MMHG | BODY MASS INDEX: 24.36 KG/M2 | HEART RATE: 94 BPM | TEMPERATURE: 98.9 F | SYSTOLIC BLOOD PRESSURE: 112 MMHG

## 2023-04-21 DIAGNOSIS — M76.892 TENDINITIS OF LEFT HIP FLEXOR: ICD-10-CM

## 2023-04-21 DIAGNOSIS — N76.0 BV (BACTERIAL VAGINOSIS): ICD-10-CM

## 2023-04-21 DIAGNOSIS — Z11.3 SCREEN FOR STD (SEXUALLY TRANSMITTED DISEASE): Primary | ICD-10-CM

## 2023-04-21 DIAGNOSIS — M70.72 ILIOPSOAS BURSITIS OF LEFT HIP: ICD-10-CM

## 2023-04-21 DIAGNOSIS — M67.952 TENDINOPATHY OF LEFT GLUTEUS MEDIUS: ICD-10-CM

## 2023-04-21 DIAGNOSIS — M47.818 ARTHROPATHY OF LEFT SACROILIAC JOINT: ICD-10-CM

## 2023-04-21 DIAGNOSIS — B96.89 BV (BACTERIAL VAGINOSIS): ICD-10-CM

## 2023-04-21 LAB
C TRACH DNA SPEC QL PROBE+SIG AMP: NEGATIVE
CLUE CELLS: PRESENT
N GONORRHOEA DNA SPEC QL NAA+PROBE: NEGATIVE
TRICHOMONAS, WET PREP: ABNORMAL
WBC'S/HIGH POWER FIELD, WET PREP: ABNORMAL
YEAST, WET PREP: ABNORMAL

## 2023-04-21 PROCEDURE — 99214 OFFICE O/P EST MOD 30 MIN: CPT | Performed by: PHYSICIAN ASSISTANT

## 2023-04-21 PROCEDURE — 87210 SMEAR WET MOUNT SALINE/INK: CPT | Mod: ZL | Performed by: PHYSICIAN ASSISTANT

## 2023-04-21 PROCEDURE — 87591 N.GONORRHOEAE DNA AMP PROB: CPT | Mod: ZL | Performed by: PHYSICIAN ASSISTANT

## 2023-04-21 RX ORDER — METRONIDAZOLE 500 MG/1
500 TABLET ORAL 2 TIMES DAILY
Qty: 14 TABLET | Refills: 0 | Status: SHIPPED | OUTPATIENT
Start: 2023-04-21 | End: 2023-04-28

## 2023-04-21 ASSESSMENT — ANXIETY QUESTIONNAIRES
7. FEELING AFRAID AS IF SOMETHING AWFUL MIGHT HAPPEN: NOT AT ALL
1. FEELING NERVOUS, ANXIOUS, OR ON EDGE: NOT AT ALL
GAD7 TOTAL SCORE: 7
IF YOU CHECKED OFF ANY PROBLEMS ON THIS QUESTIONNAIRE, HOW DIFFICULT HAVE THESE PROBLEMS MADE IT FOR YOU TO DO YOUR WORK, TAKE CARE OF THINGS AT HOME, OR GET ALONG WITH OTHER PEOPLE: SOMEWHAT DIFFICULT
7. FEELING AFRAID AS IF SOMETHING AWFUL MIGHT HAPPEN: NOT AT ALL
4. TROUBLE RELAXING: MORE THAN HALF THE DAYS
GAD7 TOTAL SCORE: 7
8. IF YOU CHECKED OFF ANY PROBLEMS, HOW DIFFICULT HAVE THESE MADE IT FOR YOU TO DO YOUR WORK, TAKE CARE OF THINGS AT HOME, OR GET ALONG WITH OTHER PEOPLE?: SOMEWHAT DIFFICULT
GAD7 TOTAL SCORE: 7
3. WORRYING TOO MUCH ABOUT DIFFERENT THINGS: MORE THAN HALF THE DAYS
5. BEING SO RESTLESS THAT IT IS HARD TO SIT STILL: NOT AT ALL
2. NOT BEING ABLE TO STOP OR CONTROL WORRYING: MORE THAN HALF THE DAYS
6. BECOMING EASILY ANNOYED OR IRRITABLE: SEVERAL DAYS

## 2023-04-21 ASSESSMENT — PAIN SCALES - GENERAL: PAINLEVEL: NO PAIN (0)

## 2023-04-21 ASSESSMENT — PATIENT HEALTH QUESTIONNAIRE - PHQ9
SUM OF ALL RESPONSES TO PHQ QUESTIONS 1-9: 3
SUM OF ALL RESPONSES TO PHQ QUESTIONS 1-9: 3
10. IF YOU CHECKED OFF ANY PROBLEMS, HOW DIFFICULT HAVE THESE PROBLEMS MADE IT FOR YOU TO DO YOUR WORK, TAKE CARE OF THINGS AT HOME, OR GET ALONG WITH OTHER PEOPLE: SOMEWHAT DIFFICULT

## 2023-04-21 NOTE — PROGRESS NOTES
Assessment & Plan   Problem List Items Addressed This Visit    None  Visit Diagnoses     Screen for STD (sexually transmitted disease)    -  Primary    Relevant Orders    Wet Prep, Genital (Completed)    GC/Chlamydia by PCR    Arthropathy of left sacroiliac joint        Relevant Medications    diclofenac (VOLTAREN) 1 % topical gel    Other Relevant Orders    Orthopedic  Referral    Tendinitis of left hip flexor        Relevant Medications    diclofenac (VOLTAREN) 1 % topical gel    Other Relevant Orders    Orthopedic  Referral    Iliopsoas bursitis of left hip        Relevant Medications    diclofenac (VOLTAREN) 1 % topical gel    Other Relevant Orders    Orthopedic  Referral    Tendinopathy of left gluteus medius        Relevant Medications    diclofenac (VOLTAREN) 1 % topical gel    Other Relevant Orders    Orthopedic  Referral    BV (bacterial vaginosis)        Relevant Medications    metroNIDAZOLE (FLAGYL) 500 MG tablet         Back and hip pain:   Encouraged to take naproxen for relief up to 2 times per day.  Encouraged rest and elevation.  Encouraged to use ice or heat 15 minutes at a time several times per day to decrease pain. Return to clinic in 1-2 weeks as necessary for persistent pain. Return to clinic with any change or worsening of symptoms.   Use voltaren gel up to 4 times daily as needed.   Encouraged yoga and stretching.   Referred to ortho for a consult.     Completed vaginal wet prep along with gonorrhea and chlamydia screen.  Vaginal wet prep was positive for clue cells.  STD screen is negative.  Bacterial vaginosis - Given metronidazole for treatment.  Do NOT drink alcohol while taking the medication.  Return in 1-2 weeks with persistent symptoms after treatment as needed.      Ordering of each unique test  30 minutes spent by me on the date of the encounter doing chart review, history and exam, documentation and further activities per the note       See  Patient Instructions    No follow-ups on file.    Sherrell Powell PA-C  [facility]    Michelle Tijerina is a 17 year old, presenting for the following health issues:  Follow Up (STD/BV recheck)        4/21/2023     8:56 AM   Additional Questions   Roomed by yonny   Accompanied by self     History of Present Illness       Reason for visit:  STD and Yeast infection Retest. Hip pain    She eats 0-1 servings of fruits and vegetables daily.She consumes 0 sweetened beverage(s) daily.She exercises with enough effort to increase her heart rate 10 to 19 minutes per day.  She exercises with enough effort to increase her heart rate 4 days per week.   She is taking medications regularly.    Today's PHQ-9         PHQ-9 Total Score: 3    PHQ-9 Q9 Thoughts of better off dead/self-harm past 2 weeks :   Not at all    How difficult have these problems made it for you to do your work, take care of things at home, or get along with other people: Somewhat difficult  Today's SONIDO-7 Score: 7         Pt presents to clinic today for a follow up and recheck of STD/BV testing. Pt states she has no symptoms.     Patient recently had a yeast infection and chlamydia.  Would like to be rechecked today to ensure resolution of infection.  Currently asymptomatic.  No new exposures.  No vaginal itching or discharge.  Otherwise feeling fine.    Patient has had recurrent left hip and left low back pain.  Has tried physical therapy and chiropractor. Has been having left hip pain for 2 years now. Nothing is helping the pain. She has seen [facility] chiropractor.  When symptoms first came she would get a pop in her left hip.  It was a sharp pain.  Now she is getting constant left hip pain.  Feels better with extreme left hip flexion and popping her hip.  Left anterior and lateral hip pain.  Has been present over the last 2 years.  No fall or injury.  No numbness or tingling.  Radiates down the left thigh on the lateral side.  Using  naproxen twice daily and Tylenol which helps little.  Using ice and heat as needed.  Ice on the left lower back helps.  Last went to the chiropractor on 4/17.  History of orthopedic consult in the past.  Interested in rechecking with ortho to further discuss symptoms.  No bowel or bladder incontinence.    Review of Systems   Constitutional, HEENT, cardiovascular, pulmonary, gi and gu systems are negative, except as otherwise noted.      Objective    /80 (BP Location: Left arm, Patient Position: Sitting, Cuff Size: Adult Regular)   Pulse 94   Temp 98.9  F (37.2  C) (Tympanic)   Resp 18   Wt 54.3 kg (119 lb 9.6 oz)   SpO2 98%   BMI 24.36 kg/m    Body mass index is 24.36 kg/m .  Physical Exam  Vitals and nursing note reviewed.   Constitutional:       General: She is not in acute distress.     Appearance: Normal appearance. She is well-developed.   HENT:      Head: Normocephalic and atraumatic.   Cardiovascular:      Rate and Rhythm: Normal rate and regular rhythm.      Heart sounds: Normal heart sounds, S1 normal and S2 normal.   Pulmonary:      Effort: Pulmonary effort is normal. No respiratory distress.      Breath sounds: Normal breath sounds. No wheezing or rales.   Genitourinary:     General: Normal vulva.      Labia:         Right: No rash or tenderness.         Left: No rash or tenderness.       Vagina: No vaginal discharge.      Cervix: No cervical motion tenderness or discharge.   Musculoskeletal:         General: No swelling or signs of injury. Normal range of motion.      Cervical back: Normal range of motion.      Comments: No spinal or paraspinous muscle pain with palpation.  Mild left SI joint pain and inferior buttock pain with palpation.  Mild pain with palpation of the left lateral and posterior hip.  No bruising or swelling appreciated.  Pain in left hip and back with left hip flexion and internal rotation.  Otherwise unremarkable lower extremity range of motion.   Skin:     General: Skin  is warm and dry.      Findings: No rash.   Neurological:      General: No focal deficit present.      Mental Status: She is alert and oriented to person, place, and time.      Motor: No weakness or abnormal muscle tone.      Coordination: Coordination normal.      Gait: Gait normal.      Deep Tendon Reflexes: Reflexes are normal and symmetric. Reflexes normal.   Psychiatric:         Mood and Affect: Mood normal.         Behavior: Behavior normal.      Labs:   Results for orders placed or performed in visit on 04/21/23   Wet Prep, Genital     Status: Abnormal    Specimen: Vagina; Swab   Result Value Ref Range    Trichomonas Absent Absent    Yeast Absent Absent    Clue Cells Present (A) Absent    WBCs/high power field 2+ (A) None

## 2023-05-01 ENCOUNTER — OFFICE VISIT (OUTPATIENT)
Dept: FAMILY MEDICINE | Facility: OTHER | Age: 18
End: 2023-05-01
Attending: PHYSICIAN ASSISTANT
Payer: COMMERCIAL

## 2023-05-01 VITALS
HEART RATE: 94 BPM | WEIGHT: 118 LBS | BODY MASS INDEX: 24.04 KG/M2 | OXYGEN SATURATION: 100 % | RESPIRATION RATE: 16 BRPM | TEMPERATURE: 99.3 F | DIASTOLIC BLOOD PRESSURE: 82 MMHG | SYSTOLIC BLOOD PRESSURE: 114 MMHG

## 2023-05-01 DIAGNOSIS — R39.9 UTI SYMPTOMS: ICD-10-CM

## 2023-05-01 DIAGNOSIS — B96.89 BV (BACTERIAL VAGINOSIS): Primary | ICD-10-CM

## 2023-05-01 DIAGNOSIS — N92.6 ABNORMAL MENSES: ICD-10-CM

## 2023-05-01 DIAGNOSIS — N89.8 VAGINAL DISCHARGE: ICD-10-CM

## 2023-05-01 DIAGNOSIS — Z11.3 SCREEN FOR STD (SEXUALLY TRANSMITTED DISEASE): ICD-10-CM

## 2023-05-01 DIAGNOSIS — Z97.5 IUD CONTRACEPTION: ICD-10-CM

## 2023-05-01 DIAGNOSIS — N76.0 BV (BACTERIAL VAGINOSIS): Primary | ICD-10-CM

## 2023-05-01 LAB
ALBUMIN UR-MCNC: NEGATIVE MG/DL
APPEARANCE UR: CLEAR
BACTERIA #/AREA URNS HPF: ABNORMAL /HPF
BILIRUB UR QL STRIP: NEGATIVE
C TRACH DNA SPEC QL PROBE+SIG AMP: NEGATIVE
CLUE CELLS: PRESENT
COLOR UR AUTO: ABNORMAL
GLUCOSE UR STRIP-MCNC: NEGATIVE MG/DL
HGB UR QL STRIP: ABNORMAL
KETONES UR STRIP-MCNC: NEGATIVE MG/DL
LEUKOCYTE ESTERASE UR QL STRIP: NEGATIVE
MUCOUS THREADS #/AREA URNS LPF: PRESENT /LPF
N GONORRHOEA DNA SPEC QL NAA+PROBE: NEGATIVE
NITRATE UR QL: NEGATIVE
PH UR STRIP: 5 [PH] (ref 5–9)
RBC URINE: 1 /HPF
SP GR UR STRIP: 1.02 (ref 1–1.03)
SQUAMOUS EPITHELIAL: 6 /HPF
TRICHOMONAS, WET PREP: ABNORMAL
UROBILINOGEN UR STRIP-MCNC: NORMAL MG/DL
WBC URINE: 2 /HPF
WBC'S/HIGH POWER FIELD, WET PREP: ABNORMAL
YEAST, WET PREP: ABNORMAL

## 2023-05-01 PROCEDURE — 87210 SMEAR WET MOUNT SALINE/INK: CPT | Mod: ZL | Performed by: PHYSICIAN ASSISTANT

## 2023-05-01 PROCEDURE — 99213 OFFICE O/P EST LOW 20 MIN: CPT | Performed by: PHYSICIAN ASSISTANT

## 2023-05-01 PROCEDURE — 87086 URINE CULTURE/COLONY COUNT: CPT | Mod: ZL | Performed by: PHYSICIAN ASSISTANT

## 2023-05-01 PROCEDURE — 81001 URINALYSIS AUTO W/SCOPE: CPT | Mod: ZL | Performed by: PHYSICIAN ASSISTANT

## 2023-05-01 PROCEDURE — 87491 CHLMYD TRACH DNA AMP PROBE: CPT | Mod: ZL | Performed by: PHYSICIAN ASSISTANT

## 2023-05-01 RX ORDER — METRONIDAZOLE 500 MG/1
500 TABLET ORAL 2 TIMES DAILY
Qty: 14 TABLET | Refills: 0 | Status: SHIPPED | OUTPATIENT
Start: 2023-05-01 | End: 2023-05-08

## 2023-05-01 ASSESSMENT — PATIENT HEALTH QUESTIONNAIRE - PHQ9
10. IF YOU CHECKED OFF ANY PROBLEMS, HOW DIFFICULT HAVE THESE PROBLEMS MADE IT FOR YOU TO DO YOUR WORK, TAKE CARE OF THINGS AT HOME, OR GET ALONG WITH OTHER PEOPLE: SOMEWHAT DIFFICULT
SUM OF ALL RESPONSES TO PHQ QUESTIONS 1-9: 3
SUM OF ALL RESPONSES TO PHQ QUESTIONS 1-9: 3

## 2023-05-01 ASSESSMENT — ENCOUNTER SYMPTOMS
COUGH: 0
LIGHT-HEADEDNESS: 0
FREQUENCY: 0
MYALGIAS: 0
ABDOMINAL PAIN: 0
SHORTNESS OF BREATH: 0
DIARRHEA: 0
PSYCHIATRIC NEGATIVE: 1
WHEEZING: 0
DYSURIA: 0
FEVER: 0
PALPITATIONS: 0
CONSTIPATION: 0
VOMITING: 0
DIZZINESS: 0
HEMATOCHEZIA: 0
FLANK PAIN: 0
HEMATURIA: 0
NAUSEA: 0
BACK PAIN: 1
CHILLS: 0

## 2023-05-01 ASSESSMENT — PAIN SCALES - GENERAL: PAINLEVEL: MILD PAIN (3)

## 2023-05-01 NOTE — PROGRESS NOTES
Assessment & Plan   Problem List Items Addressed This Visit    None  Visit Diagnoses     BV (bacterial vaginosis)    -  Primary    Relevant Medications    metroNIDAZOLE (FLAGYL) 500 MG tablet    UTI symptoms        Relevant Orders    UA Macroscopic with reflex to Microscopic and Culture (Completed)    Urine Culture Aerobic Bacterial    Screen for STD (sexually transmitted disease)        Relevant Orders    Wet Prep, Genital (Completed)    GC/Chlamydia by PCR (Completed)    Vaginal discharge        Relevant Orders    Wet Prep, Genital (Completed)    GC/Chlamydia by PCR (Completed)    Ob/Gyn Referral    US Pelvic Complete with Transvaginal    Abnormal menses        Relevant Orders    Ob/Gyn Referral    US Pelvic Complete with Transvaginal    IUD contraception        Relevant Orders    Ob/Gyn Referral    US Pelvic Complete with Transvaginal         Completed urinalysis and urine culture to rule out infection concerns.  Urinalysis is unremarkable.  Urine culture is pending to rule out infection in the bladder.    Completed gonorrhea and chlamydia STD screen.  STD screen is negative.  Also completed vaginal wet prep.  Vaginal wet prep was positive for clue cells.  This indicates that she has bacterial vaginosis.  Bacterial vaginosis - Given metronidazole for treatment.  Do NOT drink alcohol while taking the medication.  Return in 1-2 weeks with persistent symptoms after treatment as needed.    Abnormal menses: Ordered pelvic ultrasound to rule out concerns with referred to OB/GYN for consult.    Ordering of each unique test         See Patient Instructions    No follow-ups on file.    Sherrell Powell PA-C  [facility]    Michelle Tijerina is a 17 year old, presenting for the following health issues:  Abdominal Pain, Back Pain, Vaginal Problem, and UTI (Urinary burning with voiding )        5/1/2023    10:59 AM   Additional Questions   Roomed by yonny   Accompanied by self     Back Pain   Pertinent  negatives include no chest pain, no fever, no abdominal pain and no dysuria.   Vaginal Problem   Pertinent negatives include no fever, no abdominal pain, no constipation, no diarrhea, no nausea, no vomiting, no dysuria and no frequency.   UTI  Pertinent negatives include no abdominal pain, chest pain, chills, coughing, fever, myalgias, nausea, rash or vomiting.        Abdominal vaginal symptoms and possible UTI    Patient's been struggling with pain in her lower abdomen.  Having brown bloody discharge.  Slight constipation.  Having dysuria and pain with intercourse.  Has been present for a few weeks.  Has an IUD.  This was placed on 9/27/2022, Mirena.  No history of bleeding with the IUD in the past.  No pregnancy or STD concerns however she would like to be screened for STD concerns.  No nausea, vomiting, weight changes.    Review of Systems   Constitutional: Negative for chills and fever.   Respiratory: Negative for cough, shortness of breath and wheezing.    Cardiovascular: Negative for chest pain and palpitations.   Gastrointestinal: Negative for abdominal pain, constipation, diarrhea, hematochezia, nausea and vomiting.   Genitourinary: Positive for vaginal discharge. Negative for dysuria, flank pain, frequency, hematuria and urgency.   Musculoskeletal: Positive for back pain. Negative for myalgias.   Skin: Negative for rash.   Neurological: Negative for dizziness and light-headedness.   Psychiatric/Behavioral: Negative.             Objective    /82 (BP Location: Right arm, Patient Position: Sitting, Cuff Size: Adult Regular)   Pulse 94   Temp 99.3  F (37.4  C) (Tympanic)   Resp 16   Wt 53.5 kg (118 lb)   SpO2 100%   BMI 24.04 kg/m    Body mass index is 24.04 kg/m .  Physical Exam  Vitals and nursing note reviewed.   Constitutional:       General: She is not in acute distress.     Appearance: Normal appearance. She is well-developed.   Cardiovascular:      Rate and Rhythm: Normal rate and regular  rhythm.      Heart sounds: Normal heart sounds, S1 normal and S2 normal.   Pulmonary:      Effort: Pulmonary effort is normal. No respiratory distress.      Breath sounds: Normal breath sounds. No wheezing or rales.   Abdominal:      General: Abdomen is flat. Bowel sounds are normal.      Palpations: Abdomen is soft. There is no mass.      Tenderness: There is no abdominal tenderness. There is no right CVA tenderness, left CVA tenderness, guarding or rebound.   Genitourinary:     General: Normal vulva.      Labia:         Right: No rash or tenderness.         Left: No rash or tenderness.       Vagina: Vaginal discharge present.      Cervix: No cervical motion tenderness or discharge.      Comments: Medium brown/bright red blood appreciated in the vaginal canal and coming from the cervix.  IUD strings appreciated.  Musculoskeletal:         General: Normal range of motion.      Cervical back: Normal range of motion.   Skin:     General: Skin is warm and dry.      Findings: No rash.   Neurological:      Mental Status: She is alert and oriented to person, place, and time.      Motor: No abnormal muscle tone.      Deep Tendon Reflexes: Reflexes are normal and symmetric.   Psychiatric:         Mood and Affect: Mood normal.         Behavior: Behavior normal.            Results for orders placed or performed in visit on 05/01/23   UA Macroscopic with reflex to Microscopic and Culture     Status: Abnormal    Specimen: Urine, Midstream   Result Value Ref Range    Color Urine Light Yellow Colorless, Straw, Light Yellow, Yellow    Appearance Urine Clear Clear    Glucose Urine Negative Negative mg/dL    Bilirubin Urine Negative Negative    Ketones Urine Negative Negative mg/dL    Specific Gravity Urine 1.022 1.000 - 1.030    Blood Urine Small (A) Negative    pH Urine 5.0 5.0 - 9.0    Protein Albumin Urine Negative Negative mg/dL    Urobilinogen Urine Normal Normal, 2.0 mg/dL    Nitrite Urine Negative Negative    Leukocyte  Esterase Urine Negative Negative    Bacteria Urine Few (A) None Seen /HPF    Mucus Urine Present (A) None Seen /LPF    RBC Urine 1 <=2 /HPF    WBC Urine 2 <=5 /HPF    Squamous Epithelials Urine 6 (H) <=1 /HPF    Narrative    Urine Culture not indicated   Wet Prep, Genital     Status: Abnormal    Specimen: Vagina; Swab   Result Value Ref Range    Trichomonas Absent Absent    Yeast Absent Absent    Clue Cells Present (A) Absent    WBCs/high power field 1+ (A) None   GC/Chlamydia by PCR     Status: Normal    Specimen: Endocervical/cervical; Swab   Result Value Ref Range    Chlamydia Trachomatis Negative Negative    Neisseria gonorrhoeae Negative Negative    Narrative    Assay performed using [employer] real-time, reverse-transcriptase PCR.               Answers for HPI/ROS submitted by the patient on 5/1/2023  If you checked off any problems, how difficult have these problems made it for you to do your work, take care of things at home, or get along with other people?: Somewhat difficult  PHQ9 TOTAL SCORE: 3

## 2023-05-01 NOTE — NURSING NOTE
Pt presents to clinic today for abdominal pain, bleeding, discharge, urinary burning.       FOOD SECURITY SCREENING QUESTIONS:    The next two questions are to help us understand your food security.  If you are feeling you need any assistance in this area, we have resources available to support you today.    Hunger Vital Signs:  Within the past 12 months we worried whether our food would run out before we got money to buy more. Never  Within the past 12 months the food we bought just didn't last and we didn't have money to get more. Never          Medication Reconciliation: complete  Trung Santiago LPN,LPN on 5/1/2023 at 11:01 AM

## 2023-05-03 LAB — BACTERIA UR CULT: NO GROWTH

## 2023-05-08 ENCOUNTER — MYC MEDICAL ADVICE (OUTPATIENT)
Dept: FAMILY MEDICINE | Facility: OTHER | Age: 18
End: 2023-05-08
Payer: COMMERCIAL

## 2023-05-11 NOTE — PROGRESS NOTES
"[facility]    Outpatient Physical Therapy Discharge Note  Patient: Lilliana Viera  : 2005    End Dates of Reporting Period:  2023      Referring Provider: José Luis Tyson MD    Therapy Diagnosis: Left hip pain     Client Self Report: ( As of last appointment on 23)  Lilliana reports she experienced an increased pain level this week after busy days at work. Pain remains focused in the left posterior to lateral hip. Notes increased \"popping\" in the left lateral hip. Reports she has been continuing with her strengthening program.    Objective Measurements:  Objective Measure: Left hip mobility  Details: Left hip IROT ROM has improved to equal with right  Objective Measure: Left lower extremity strength  Details: Hip flexion 4+/5 no pain, hip EROT 4+/5 with pain posterior /lat hip, hip adduction 4+/5 with discomfort lateral hip, hip ext 4+/5 with min discomfort.  Objective Measure: Palpation  Details: Note persistent left hip muscle group tenderness, primarily glute med, piriformis  Objective Measure: Pelvic alignment  Details: NML  alignment seen                Goals:  Goal Identifier Pain   Goal Description Patient will report she is able to complete 8 hour/day work shifts up to 40 hours/week without limitation due to left hip pain in excess of 2/10 at any time.  Patient will be able to complete other recreational and desired pursuits including longer duration walking etc. as desired without limitation due to left hip pain in 8-12 weeks   Target Date 23   Date Met      Progress (detail required for progress note):  Goal not met. Patient continues to experience a variable level of left lateral to anterior hip pain that is worse with activity     Goal Identifier Strength   Goal Description Patient will demonstrate grade 5/5 MMT strength throughout the entire left hip knee and ankle muscle group.  " This will support her ability to complete all normal job tasks up to 40 hours/week 8 hours/day without limitation.  She will demo good stability throughout the hip complex for all activities including walking and other recreational pursuits as desired and 8 to 12 weeks   Target Date 04/12/23   Date Met      Progress (detail required for progress note):  Patient has been completing a strengthening program focused on hip stabilization     Goal Identifier Mobility   Goal Description Patient will demonstrate full left hip mobility in all positions without limitation to endrange discomfort.  This will support the mobility required to complete all job tasks including lifting carrying weights up to 20 to 30 pounds, bending into squat positions and lifting stocking shelves and other tasks as required at her job and at home in 8 to 12 weeks   Target Date 04/12/23   Date Met      Progress (detail required for progress note):  Work task at the outside of nml hip ROM cause residual discomfort     Plan:  Discharge from therapy.    Discharge:    Reason for Discharge: Patient has failed to schedule further appointments.    Equipment Issued: None    Discharge Plan: Patient to continue home program.

## 2023-05-23 ENCOUNTER — OFFICE VISIT (OUTPATIENT)
Dept: OBGYN | Facility: OTHER | Age: 18
End: 2023-05-23
Attending: PHYSICIAN ASSISTANT
Payer: COMMERCIAL

## 2023-05-23 ENCOUNTER — HOSPITAL ENCOUNTER (OUTPATIENT)
Dept: ULTRASOUND IMAGING | Facility: OTHER | Age: 18
Discharge: HOME | End: 2023-05-23
Attending: PHYSICIAN ASSISTANT
Payer: COMMERCIAL

## 2023-05-23 VITALS
HEART RATE: 68 BPM | WEIGHT: 118 LBS | DIASTOLIC BLOOD PRESSURE: 68 MMHG | SYSTOLIC BLOOD PRESSURE: 122 MMHG | BODY MASS INDEX: 24.04 KG/M2

## 2023-05-23 DIAGNOSIS — N92.6 ABNORMAL MENSES: ICD-10-CM

## 2023-05-23 DIAGNOSIS — N94.10 DYSPAREUNIA IN FEMALE: ICD-10-CM

## 2023-05-23 DIAGNOSIS — N89.8 VAGINAL DISCHARGE: Primary | ICD-10-CM

## 2023-05-23 DIAGNOSIS — N89.8 VAGINAL DISCHARGE: ICD-10-CM

## 2023-05-23 DIAGNOSIS — Z97.5 IUD CONTRACEPTION: ICD-10-CM

## 2023-05-23 LAB
C TRACH DNA SPEC QL PROBE+SIG AMP: NEGATIVE
CLUE CELLS: NORMAL
N GONORRHOEA DNA SPEC QL NAA+PROBE: NEGATIVE
TRICHOMONAS, WET PREP: NORMAL
WBC'S/HIGH POWER FIELD, WET PREP: NORMAL
YEAST, WET PREP: NORMAL

## 2023-05-23 PROCEDURE — 87591 N.GONORRHOEAE DNA AMP PROB: CPT | Mod: ZL | Performed by: STUDENT IN AN ORGANIZED HEALTH CARE EDUCATION/TRAINING PROGRAM

## 2023-05-23 PROCEDURE — 87210 SMEAR WET MOUNT SALINE/INK: CPT | Mod: ZL | Performed by: STUDENT IN AN ORGANIZED HEALTH CARE EDUCATION/TRAINING PROGRAM

## 2023-05-23 PROCEDURE — 99213 OFFICE O/P EST LOW 20 MIN: CPT | Performed by: STUDENT IN AN ORGANIZED HEALTH CARE EDUCATION/TRAINING PROGRAM

## 2023-05-23 PROCEDURE — 87491 CHLMYD TRACH DNA AMP PROBE: CPT | Mod: ZL | Performed by: STUDENT IN AN ORGANIZED HEALTH CARE EDUCATION/TRAINING PROGRAM

## 2023-05-23 PROCEDURE — 76856 US EXAM PELVIC COMPLETE: CPT

## 2023-05-23 ASSESSMENT — PATIENT HEALTH QUESTIONNAIRE - PHQ9
10. IF YOU CHECKED OFF ANY PROBLEMS, HOW DIFFICULT HAVE THESE PROBLEMS MADE IT FOR YOU TO DO YOUR WORK, TAKE CARE OF THINGS AT HOME, OR GET ALONG WITH OTHER PEOPLE: SOMEWHAT DIFFICULT
SUM OF ALL RESPONSES TO PHQ QUESTIONS 1-9: 2
SUM OF ALL RESPONSES TO PHQ QUESTIONS 1-9: 2

## 2023-05-23 ASSESSMENT — PAIN SCALES - GENERAL: PAINLEVEL: MILD PAIN (2)

## 2023-05-23 NOTE — PROGRESS NOTES
Gynecology Office Visit    Chief Complaint: pain with intercourse, vaginal discharge    HPI:    Lilliana Viera is a 17 year old , here for discussion of pain with intercourse and vaginal discharge. Approximately 2 months ago she was diagnosed with chlamydia. This was treated without difficulty. Since then she has had other vaginal inflammations including yeast vulvovaginitis and bacterial vaginosis. She has a light brown/sometimes whitish discharge at this time.    She also endorses some pelvic pain during intercourse. The pain is located deeper with intercourse and is not on the introitus or vulva. It feels deep and somewhat towards her right side.     She has a Mirena IUD in place, this was placed fall . She likes this form of contraception and would like to keep the IUD during the workup. She does not get regular periods while this is in place.    She denies any fevers, chills, dysuria or troubles urinating.    OBHx  OB History    Para Term  AB Living   0 0 0 0 0 0   SAB IAB Ectopic Multiple Live Births   0 0 0 0 0     Past medical history:  Past Medical History:   Diagnosis Date     Closed fracture of phalanx of finger          Migraine without status migrainosus, not intractable     No Comments Provided     Specifically denies VTE, DM, HTN or bleeding disorders    Past Surgical History:  Past Surgical History:   Procedure Laterality Date     ARTHROSCOPIC RECONSTRUCTION ANTERIOR CRUCIATE LIGAMENT BONE TENDON BONE AUTOGRAFT Left 2/15/2019    Procedure: Examination Under Anesthesia Left Knee, Left Anterior Cruciate Ligament Reconstruction, Bone Tendon Bone Autograft;  Surgeon: Himanshu Cortez MD;  Location: [facility] OR     OTHER SURGICAL HISTORY      [id number],NO PREVIOUS SURGERY       Medications:  Current Outpatient Medications   Medication     albuterol (PROAIR HFA/PROVENTIL HFA/VENTOLIN HFA) 108 (90 Base) MCG/ACT inhaler     ALPRAZolam (XANAX) 0.25 MG tablet     diclofenac  (VOLTAREN) 1 % topical gel     escitalopram (LEXAPRO) 10 MG tablet     naproxen (NAPROSYN) 500 MG tablet     No current facility-administered medications for this visit.       Allergies:     No Known Allergies      Social History:  Social History     Tobacco Use     Smoking status: Never     Smokeless tobacco: Never   Vaping Use     Vaping status: Never Used   Substance Use Topics     Alcohol use: No     Alcohol/week: 0.0 standard drinks of alcohol     Drug use: Never       Family History:  Family History   Problem Relation Age of Onset     Asthma Brother         Asthma,winter     ROS:   Respiratory: No shortness of breath, dyspnea on exertion, cough, or hemoptysis  Cardiovascular: negative for palpitations, chest pain, lower extremity edema and syncope or near-syncope  Gastrointestinal: negative for, nausea, vomiting and hematemesis  Genitourinary: negative for, dysuria, frequency and urgency, +dyspareunia, +vaginal discharge  Musculoskeletal: negative for, back pain and muscular weakness  Psychiatric: negative for, anxiety, depression and hallucinations  Hematologic/Lymphatic/Immunologic: negative for, anemia, chills and fever      Physical Exam  /68   Pulse 68   Wt 53.5 kg (118 lb)   BMI 24.04 kg/m    Gen: Well-appearing, no acute distressed, well-groomed, alert  HEENT: Normocephalic, atraumatic  Cardiovascular: Regular rate, No peripheral edema, normal peripheral circulation  Pulm: Symmetric chest rise, non-labored respirations  Abd: Soft, non-tender, non-distended  Ext: No LE edema, extremities warm and well perfused  Pelvic:  Normal appearing external female genitalia. Normal hair distribution. Vagina is without lesions with moist, pink ruggae. There is scant clear vaginal discharge. Cervix nulliparous, no lesions, no cervical motion tenderness. IUD strings noted at the external os. Uterus is approximately 7cm, mobile, non-tender.     I am able to recreate the pain she feels during intercourse during  bimanual exam when palpation of the right, anterior vaginal wall and right ovary occur. No other regions of the pelvic exam were instigating the pain she endorses.      Pelvic US from 2023  FINDINGS:     The uterus measures 5.9 x 3.9 x 3.0 cm.     Endometrial thickness is 3.7 mm. An intrauterine device is present  centrally within the endometrial canal.     The right ovary is 3.1 x 2.7 x 1.7 cm.      The left ovary is 3.6 x 2.4 x 1.5 cm.      Doppler flow is visible in both ovaries. There is no evidence of  torsion.     There is a small volume of free fluid in the cul-de-sac.                                                                      IMPRESSION: Intrauterine device present within the endometrial canal  without evidence of complication.     Small volume of fluid in the cul-de-sac likely physiologic    Assessment/Plan  Lilliana Viera is a 17 year old  female here for vaginal discharge and dyspareunia.    # Vaginal discharge  -- No notable discharge on exam today, I did see evidence of old blood, likely from scant bleeding in light of IUD in place.  -- Wet prep collected today: negative  -- Repeat GC/Chlam collected: negative    # Dyspareunia  -- Pelvic US today shows normal pelvic anatomy: no sign of adnexal mass, cyst or hydrosalpinx  -- No cervical motion tenderness to warrant treatment for PID  -- Pain re-created during bimanual exam when right anterior vaginal wall is palpated  -- Pelvic Floor PT offered  -- Discussed if we try PT and she still notes the pain during intercourse, we could attempt to remove the IUD and see if this is causing the discomfort. I understand and respect that she wants this to be a last-resort as it is her preferred contraceptive method. We also discussed that the IUD is located in the correct position within the center of the endometrial canal.  -- Discussed other etiologies of right lower quadrant pain to consider, she will monitor for patterns or symptoms  including GI and urinary.    Total amount of time spent during today's encounter including chart prep, face to face, documentation was 21 minutes.     Clari Ruiz MD on 5/23/2023 at 2:48 PM       Answers for HPI/ROS submitted by the patient on 5/23/2023  If you checked off any problems, how difficult have these problems made it for you to do your work, take care of things at home, or get along with other people?: Somewhat difficult  PHQ9 TOTAL SCORE: 2

## 2023-05-26 ENCOUNTER — OFFICE VISIT (OUTPATIENT)
Dept: FAMILY MEDICINE | Facility: OTHER | Age: 18
End: 2023-05-26
Attending: FAMILY MEDICINE
Payer: COMMERCIAL

## 2023-05-26 ENCOUNTER — HOSPITAL ENCOUNTER (EMERGENCY)
Facility: OTHER | Age: 18
Discharge: HOME | End: 2023-05-26
Attending: EMERGENCY MEDICINE | Admitting: EMERGENCY MEDICINE
Payer: COMMERCIAL

## 2023-05-26 VITALS
RESPIRATION RATE: 16 BRPM | SYSTOLIC BLOOD PRESSURE: 114 MMHG | OXYGEN SATURATION: 98 % | BODY MASS INDEX: 24.45 KG/M2 | HEART RATE: 76 BPM | TEMPERATURE: 98.9 F | DIASTOLIC BLOOD PRESSURE: 68 MMHG | WEIGHT: 117 LBS

## 2023-05-26 VITALS
HEART RATE: 72 BPM | HEIGHT: 58 IN | BODY MASS INDEX: 24.77 KG/M2 | TEMPERATURE: 98 F | OXYGEN SATURATION: 99 % | SYSTOLIC BLOOD PRESSURE: 120 MMHG | RESPIRATION RATE: 16 BRPM | DIASTOLIC BLOOD PRESSURE: 83 MMHG | WEIGHT: 118 LBS

## 2023-05-26 DIAGNOSIS — R39.9 UTI SYMPTOMS: Primary | ICD-10-CM

## 2023-05-26 DIAGNOSIS — N39.0 URINARY TRACT INFECTION WITH HEMATURIA, SITE UNSPECIFIED: ICD-10-CM

## 2023-05-26 DIAGNOSIS — R31.9 URINARY TRACT INFECTION WITH HEMATURIA, SITE UNSPECIFIED: ICD-10-CM

## 2023-05-26 DIAGNOSIS — Z87.442 HISTORY OF KIDNEY STONES: ICD-10-CM

## 2023-05-26 LAB
ALBUMIN UR-MCNC: 20 MG/DL
ALBUMIN UR-MCNC: 20 MG/DL
APPEARANCE UR: ABNORMAL
APPEARANCE UR: CLEAR
BACTERIA #/AREA URNS HPF: ABNORMAL /HPF
BILIRUB UR QL STRIP: NEGATIVE
BILIRUB UR QL STRIP: NEGATIVE
COLOR UR AUTO: ABNORMAL
COLOR UR AUTO: YELLOW
GLUCOSE UR STRIP-MCNC: NEGATIVE MG/DL
GLUCOSE UR STRIP-MCNC: NEGATIVE MG/DL
HGB UR QL STRIP: ABNORMAL
HGB UR QL STRIP: NEGATIVE
KETONES UR STRIP-MCNC: 10 MG/DL
KETONES UR STRIP-MCNC: NEGATIVE MG/DL
LEUKOCYTE ESTERASE UR QL STRIP: ABNORMAL
LEUKOCYTE ESTERASE UR QL STRIP: ABNORMAL
MUCOUS THREADS #/AREA URNS LPF: PRESENT /LPF
MUCOUS THREADS #/AREA URNS LPF: PRESENT /LPF
NITRATE UR QL: NEGATIVE
NITRATE UR QL: NEGATIVE
PH UR STRIP: 6.5 [PH] (ref 5–9)
PH UR STRIP: 7 [PH] (ref 5–9)
RBC URINE: 2 /HPF
RBC URINE: 2 /HPF
SP GR UR STRIP: 1.03 (ref 1–1.03)
SP GR UR STRIP: 1.03 (ref 1–1.03)
SQUAMOUS EPITHELIAL: 17 /HPF
SQUAMOUS EPITHELIAL: 3 /HPF
UROBILINOGEN UR STRIP-MCNC: NORMAL MG/DL
UROBILINOGEN UR STRIP-MCNC: NORMAL MG/DL
WBC URINE: 22 /HPF
WBC URINE: 29 /HPF

## 2023-05-26 PROCEDURE — 99283 EMERGENCY DEPT VISIT LOW MDM: CPT | Performed by: EMERGENCY MEDICINE

## 2023-05-26 PROCEDURE — 99284 EMERGENCY DEPT VISIT MOD MDM: CPT

## 2023-05-26 PROCEDURE — 99213 OFFICE O/P EST LOW 20 MIN: CPT | Performed by: FAMILY MEDICINE

## 2023-05-26 PROCEDURE — 81001 URINALYSIS AUTO W/SCOPE: CPT | Performed by: EMERGENCY MEDICINE

## 2023-05-26 PROCEDURE — 81001 URINALYSIS AUTO W/SCOPE: CPT | Mod: ZL | Performed by: FAMILY MEDICINE

## 2023-05-26 PROCEDURE — 87086 URINE CULTURE/COLONY COUNT: CPT | Performed by: EMERGENCY MEDICINE

## 2023-05-26 RX ORDER — PHENAZOPYRIDINE HYDROCHLORIDE 200 MG/1
200 TABLET, FILM COATED ORAL 3 TIMES DAILY
Qty: 9 TABLET | Refills: 0 | Status: SHIPPED | OUTPATIENT
Start: 2023-05-26 | End: 2023-05-29

## 2023-05-26 RX ORDER — NITROFURANTOIN 25; 75 MG/1; MG/1
100 CAPSULE ORAL 2 TIMES DAILY
Qty: 14 CAPSULE | Refills: 0 | Status: SHIPPED | OUTPATIENT
Start: 2023-05-26 | End: 2023-06-26

## 2023-05-26 ASSESSMENT — ENCOUNTER SYMPTOMS
DIZZINESS: 0
FEVER: 0
HEMATURIA: 1
APNEA: 0
VOMITING: 0
DYSURIA: 1
FREQUENCY: 1
CHILLS: 0
FLANK PAIN: 1
COLOR CHANGE: 0
APPETITE CHANGE: 0
SHORTNESS OF BREATH: 0
LIGHT-HEADEDNESS: 0
CHEST TIGHTNESS: 0
DIFFICULTY URINATING: 1
ADENOPATHY: 0
ACTIVITY CHANGE: 0
DYSURIA: 1
ARTHRALGIAS: 0
SHORTNESS OF BREATH: 0
AGITATION: 0
FREQUENCY: 1
ARTHRALGIAS: 0
AGITATION: 0
MYALGIAS: 0
PALPITATIONS: 0

## 2023-05-26 ASSESSMENT — PAIN SCALES - GENERAL: PAINLEVEL: MODERATE PAIN (5)

## 2023-05-26 NOTE — ED PROVIDER NOTES
History     Chief Complaint   Patient presents with     Dysuria     Abdominal Pain     HPI  Lilliana Viera is a 17 year old female who is here reporting urinary frequency and painful urination.  She has had some pelvic discomfort for quite some time, has had recent evaluation for this, most recently just 3 days ago.  That note is reviewed.  She states last night it got quite bad.  She is in a lot of pain with urination.  She is going frequently.  Is not much there when she goes at times.  Does feel like previous UTIs.  Also has a history of kidney stones and the pain is not to that level.  Not feeling ill otherwise.    Allergies:  No Known Allergies    Problem List:    Patient Active Problem List    Diagnosis Date Noted     Occipital neuralgia of left side 03/09/2022     Priority: Medium     Migraine with aura and without status migrainosus, not intractable 03/09/2022     Priority: Medium     Major depressive disorder, single episode, severe without psychotic features (H) 02/09/2022     Priority: Medium     Anxiety 02/09/2022     Priority: Medium     Asthma 02/17/2010     Priority: Medium        Past Medical History:    Past Medical History:   Diagnosis Date     Closed fracture of phalanx of finger      Migraine without status migrainosus, not intractable        Past Surgical History:    Past Surgical History:   Procedure Laterality Date     ARTHROSCOPIC RECONSTRUCTION ANTERIOR CRUCIATE LIGAMENT BONE TENDON BONE AUTOGRAFT Left 2/15/2019    Procedure: Examination Under Anesthesia Left Knee, Left Anterior Cruciate Ligament Reconstruction, Bone Tendon Bone Autograft;  Surgeon: Himanshu Cortez MD;  Location: [facility] OR     OTHER SURGICAL HISTORY      [id number],NO PREVIOUS SURGERY       Family History:    Family History   Problem Relation Age of Onset     Asthma Brother         Asthma,winter       Social History:  Marital Status:  Single [1]  Social History     Tobacco Use     Smoking status: Never     Smokeless  "tobacco: Never   Vaping Use     Vaping status: Never Used   Substance Use Topics     Alcohol use: No     Alcohol/week: 0.0 standard drinks of alcohol     Drug use: Never        Medications:    nitroFURantoin macrocrystal-monohydrate (MACROBID) 100 MG capsule  phenazopyridine (PYRIDIUM) 200 MG tablet  albuterol (PROAIR HFA/PROVENTIL HFA/VENTOLIN HFA) 108 (90 Base) MCG/ACT inhaler  ALPRAZolam (XANAX) 0.25 MG tablet  diclofenac (VOLTAREN) 1 % topical gel  escitalopram (LEXAPRO) 10 MG tablet  naproxen (NAPROSYN) 500 MG tablet          Review of Systems   Constitutional: Negative for chills and fever.   HENT: Negative for congestion.    Eyes: Negative for visual disturbance.   Respiratory: Negative for chest tightness and shortness of breath.    Cardiovascular: Negative for chest pain.   Gastrointestinal: Negative for vomiting.   Genitourinary: Positive for dysuria and frequency. Negative for vaginal bleeding.   Musculoskeletal: Negative for arthralgias.   Skin: Negative for rash.   Neurological: Negative for light-headedness.   Psychiatric/Behavioral: Negative for agitation.       Physical Exam   BP: 120/83  Pulse: 72  Temp: 98  F (36.7  C)  Resp: 16  Height: 147.3 cm (4' 10\")  Weight: 53.5 kg (118 lb)  SpO2: 99 %      Physical Exam  Vitals and nursing note reviewed.   Constitutional:       Appearance: She is well-developed.   HENT:      Head: Normocephalic and atraumatic.      Mouth/Throat:      Mouth: Mucous membranes are moist.   Eyes:      Conjunctiva/sclera: Conjunctivae normal.   Cardiovascular:      Rate and Rhythm: Normal rate.   Pulmonary:      Effort: Pulmonary effort is normal.   Skin:     General: Skin is warm and dry.   Neurological:      Mental Status: She is oriented to person, place, and time.   Psychiatric:         Behavior: Behavior normal.         ED Course                 Procedures           Results for orders placed or performed during the hospital encounter of 05/26/23 (from the past 24 hour(s)) "   UA with Microscopic reflex to Culture    Specimen: Urine, Midstream   Result Value Ref Range    Color Urine Light Yellow Colorless, Straw, Light Yellow, Yellow    Appearance Urine Slightly Cloudy (A) Clear    Glucose Urine Negative Negative mg/dL    Bilirubin Urine Negative Negative    Ketones Urine Negative Negative mg/dL    Specific Gravity Urine 1.028 1.000 - 1.030    Blood Urine Moderate (A) Negative    pH Urine 6.5 5.0 - 9.0    Protein Albumin Urine 20 (A) Negative mg/dL    Urobilinogen Urine Normal Normal, 2.0 mg/dL    Nitrite Urine Negative Negative    Leukocyte Esterase Urine Large (A) Negative    Bacteria Urine Few (A) None Seen /HPF    Mucus Urine Present (A) None Seen /LPF    RBC Urine 2 <=2 /HPF    WBC Urine 29 (H) <=5 /HPF    Squamous Epithelials Urine 17 (H) <=1 /HPF    Narrative    Urine Culture ordered based on laboratory criteria       Medications - No data to display    Assessments & Plan (with Medical Decision Making)     I have reviewed the nursing notes.    I have reviewed the findings, diagnosis, plan and need for follow up with the patient.  UA certainly suggestive of UTI.  We will get a culture of this.  Given her recent evaluation, will not do further work-up at this time.  We will try treating with some Macrobid and some Pyridium.  Return if worse, otherwise follow-up in clinic if not improving.        New Prescriptions    NITROFURANTOIN MACROCRYSTAL-MONOHYDRATE (MACROBID) 100 MG CAPSULE    Take 1 capsule (100 mg) by mouth 2 times daily    PHENAZOPYRIDINE (PYRIDIUM) 200 MG TABLET    Take 1 tablet (200 mg) by mouth 3 times daily for 3 days       Final diagnoses:   Urinary tract infection with hematuria, site unspecified       5/26/2023   [facility]     Reginald Whipple MD  05/26/23 1019

## 2023-05-26 NOTE — ED TRIAGE NOTES
Pt here by herself with c/o frequent and painful urination and abdominal pain off and on for the past few days, VSS, pt out into waiting room, UA collected     Triage Assessment     Row Name 05/26/23 0856       Triage Assessment (Adult)    Airway WDL WDL       Respiratory WDL    Respiratory WDL WDL       Skin Circulation/Temperature WDL    Skin Circulation/Temperature WDL WDL       Cardiac WDL    Cardiac WDL WDL       Peripheral/Neurovascular WDL    Peripheral Neurovascular WDL WDL       Cognitive/Neuro/Behavioral WDL    Cognitive/Neuro/Behavioral WDL WDL

## 2023-05-26 NOTE — PROGRESS NOTES
Assessment & Plan     (R39.9) UTI symptoms  (primary encounter diagnosis)  Comment: Patient was seen in the emergency department this morning and tested positive for urinary tract infection.  This sample was contaminated by epithelial squamous cells so repeated UA in clinic today.  Patient's symptoms have been associated with diffuse lower abdominal pain, dysuria, suprapubic pain, darrell hematuria.  Plan: UA with Microscopic reflex to Culture, Urine         Culture          (Z87.442) History of kidney stones  Comment: Patient has a history of kidney stones and is concerned that her symptoms could be complicated by a kidney stone at this time.  We discussed that this is less likely and the management of her current symptoms would include hydration and treatment of urinary tract infection.  Recommend taking nitrofurantoin for urinary tract infection and monitoring pain symptoms throughout the next week.  Differential diagnosis for chronic remitting diffuse pelvic pain includes endometriosis.  Plan: No current prescription or recommendation directly pertinent to nephrolithiasis.                 No follow-ups on file.    Patient was seen and examined by me as well as Javon Barrios, MS3    Giuliano Sigala MD  [facility]    Michelle Tijerina is a 17 year old, presenting for the following health issues:  Urinary Problem (Possible kidney stones)        5/1/2023    10:59 AM   Additional Questions   Roomed by yonny   Accompanied by self     Patient presents to clinic today to discuss urinary tract infection diagnosed in the emergency department this morning and her concern for kidney stones.  Patient has a history of kidney stones and she is worried that her current symptoms are being complicated by kidney stones.  Patient was prescribed nitrofurantoin for her urinary tract infection in the emergency department.  She has a recent history of chlamydia and subsequent Gardnerella vaginosis infection that  were both treated with antibiotics.  Patient has subsequently tested negative for chlamydia.    History of Present Illness       Reason for visit:  Painful urination, blood in urine, stomach cramps, back pain, nausea, and frequent urination  Symptom onset:  More than a month  Symptoms include:  Painful urination, blood in urine, stomach cramps, back pain, nausea, and frequent urination  Symptom intensity:  Moderate  Symptom progression:  Worsening  Had these symptoms before:  Yes  Has tried/received treatment for these symptoms:  Yes  Previous treatment was successful:  Yes  Prior treatment description:  I had pain and nausea medication until i passed my kindey stone  What makes it worse:  Urinating, standing/sitting in one place for too long  What makes it better:  Nothing has helped so far    She eats 0-1 servings of fruits and vegetables daily.She consumes 1 sweetened beverage(s) daily.She exercises with enough effort to increase her heart rate 10 to 19 minutes per day.  She exercises with enough effort to increase her heart rate 4 days per week.   She is taking medications regularly.    Today's PHQ-9         PHQ-9 Total Score: 2    PHQ-9 Q9 Thoughts of better off dead/self-harm past 2 weeks :   Not at all    How difficult have these problems made it for you to do your work, take care of things at home, or get along with other people: Somewhat difficult               Review of Systems   Constitutional: Negative for activity change and appetite change.   HENT: Negative for congestion and dental problem.    Respiratory: Negative for apnea and shortness of breath.    Cardiovascular: Negative for chest pain, palpitations and peripheral edema.   Endocrine: Negative for cold intolerance and heat intolerance.   Genitourinary: Positive for difficulty urinating, dysuria, flank pain, frequency, hematuria and pelvic pain. Negative for vaginal discharge.   Musculoskeletal: Negative for arthralgias and myalgias.   Skin:  Negative for color change.   Neurological: Negative for dizziness.   Hematological: Negative for adenopathy.   Psychiatric/Behavioral: Negative for agitation and behavioral problems.            Objective    /68   Pulse 76   Temp 98.9  F (37.2  C) (Tympanic)   Resp 16   Wt 53.1 kg (117 lb)   LMP  (LMP Unknown)   SpO2 98%   BMI 24.45 kg/m    Body mass index is 24.45 kg/m .  Physical Exam  Cardiovascular:      Rate and Rhythm: Normal rate and regular rhythm.      Pulses: Normal pulses.      Heart sounds: Normal heart sounds.   Pulmonary:      Effort: Pulmonary effort is normal.      Breath sounds: Normal breath sounds.   Skin:     General: Skin is warm.   Neurological:      General: No focal deficit present.      Mental Status: She is alert. Mental status is at baseline.   Psychiatric:         Mood and Affect: Mood normal.         Behavior: Behavior normal.         Thought Content: Thought content normal.         Judgment: Judgment normal.            No results found for this or any previous visit (from the past 24 hour(s)).    ----- Services Performed by a MEDICAL STUDENT in Presence of RESIDENT/FELLOW Physician-------        Giuliano Sigala MD on 5/26/2023 at 5:03 PM

## 2023-05-26 NOTE — NURSING NOTE
"Chief Complaint   Patient presents with     Urinary Problem     Possible kidney stones       Initial /68   Pulse 76   Temp 98.9  F (37.2  C) (Tympanic)   Resp 16   Wt 53.1 kg (117 lb)   LMP  (LMP Unknown)   SpO2 98%   BMI 24.45 kg/m   Estimated body mass index is 24.45 kg/m  as calculated from the following:    Height as of an earlier encounter on 5/26/23: 1.473 m (4' 10\").    Weight as of this encounter: 53.1 kg (117 lb).  Medication Reconciliation: complete    FOOD SECURITY SCREENING QUESTIONS  Hunger Vital Signs:  Within the past 12 months we worried whether our food would run out before we got money to buy more. Never  Within the past 12 months the food we bought just didn't last and we didn't have money to get more. Never  Sherrell Thorne LPN 5/26/2023 2:48 PM        "

## 2023-05-29 LAB — BACTERIA UR CULT: ABNORMAL

## 2023-06-26 ENCOUNTER — OFFICE VISIT (OUTPATIENT)
Dept: FAMILY MEDICINE | Facility: OTHER | Age: 18
End: 2023-06-26
Attending: STUDENT IN AN ORGANIZED HEALTH CARE EDUCATION/TRAINING PROGRAM
Payer: COMMERCIAL

## 2023-06-26 VITALS
HEART RATE: 80 BPM | BODY MASS INDEX: 25.24 KG/M2 | DIASTOLIC BLOOD PRESSURE: 62 MMHG | HEIGHT: 58 IN | SYSTOLIC BLOOD PRESSURE: 98 MMHG | OXYGEN SATURATION: 98 % | WEIGHT: 120.25 LBS | TEMPERATURE: 98 F | RESPIRATION RATE: 18 BRPM

## 2023-06-26 DIAGNOSIS — R30.0 DYSURIA: ICD-10-CM

## 2023-06-26 DIAGNOSIS — N89.8 VAGINAL DISCHARGE: Primary | ICD-10-CM

## 2023-06-26 DIAGNOSIS — B96.89 BACTERIAL VAGINOSIS: ICD-10-CM

## 2023-06-26 DIAGNOSIS — N76.0 BACTERIAL VAGINOSIS: ICD-10-CM

## 2023-06-26 LAB
ALBUMIN UR-MCNC: NEGATIVE MG/DL
APPEARANCE UR: CLEAR
BILIRUB UR QL STRIP: NEGATIVE
C TRACH DNA SPEC QL PROBE+SIG AMP: NEGATIVE
CLUE CELLS: PRESENT
COLOR UR AUTO: NORMAL
GLUCOSE UR STRIP-MCNC: NEGATIVE MG/DL
HGB UR QL STRIP: NEGATIVE
KETONES UR STRIP-MCNC: NEGATIVE MG/DL
LEUKOCYTE ESTERASE UR QL STRIP: NEGATIVE
N GONORRHOEA DNA SPEC QL NAA+PROBE: NEGATIVE
NITRATE UR QL: NEGATIVE
PH UR STRIP: 6 [PH] (ref 5–9)
SP GR UR STRIP: 1.02 (ref 1–1.03)
TRICHOMONAS, WET PREP: ABNORMAL
UROBILINOGEN UR STRIP-MCNC: NORMAL MG/DL
WBC'S/HIGH POWER FIELD, WET PREP: ABNORMAL
YEAST, WET PREP: ABNORMAL

## 2023-06-26 PROCEDURE — 87210 SMEAR WET MOUNT SALINE/INK: CPT | Mod: ZL | Performed by: STUDENT IN AN ORGANIZED HEALTH CARE EDUCATION/TRAINING PROGRAM

## 2023-06-26 PROCEDURE — 99213 OFFICE O/P EST LOW 20 MIN: CPT | Performed by: STUDENT IN AN ORGANIZED HEALTH CARE EDUCATION/TRAINING PROGRAM

## 2023-06-26 PROCEDURE — 87491 CHLMYD TRACH DNA AMP PROBE: CPT | Mod: ZL | Performed by: STUDENT IN AN ORGANIZED HEALTH CARE EDUCATION/TRAINING PROGRAM

## 2023-06-26 PROCEDURE — 81003 URINALYSIS AUTO W/O SCOPE: CPT | Mod: ZL | Performed by: STUDENT IN AN ORGANIZED HEALTH CARE EDUCATION/TRAINING PROGRAM

## 2023-06-26 RX ORDER — CLINDAMYCIN PHOSPHATE 20 MG/G
1 CREAM VAGINAL AT BEDTIME
Qty: 40 G | Refills: 0 | Status: SHIPPED | OUTPATIENT
Start: 2023-06-26 | End: 2023-07-03

## 2023-06-26 ASSESSMENT — PATIENT HEALTH QUESTIONNAIRE - PHQ9
SUM OF ALL RESPONSES TO PHQ QUESTIONS 1-9: 2
10. IF YOU CHECKED OFF ANY PROBLEMS, HOW DIFFICULT HAVE THESE PROBLEMS MADE IT FOR YOU TO DO YOUR WORK, TAKE CARE OF THINGS AT HOME, OR GET ALONG WITH OTHER PEOPLE: SOMEWHAT DIFFICULT
SUM OF ALL RESPONSES TO PHQ QUESTIONS 1-9: 2

## 2023-06-26 ASSESSMENT — ANXIETY QUESTIONNAIRES
GAD7 TOTAL SCORE: 14
7. FEELING AFRAID AS IF SOMETHING AWFUL MIGHT HAPPEN: NOT AT ALL
3. WORRYING TOO MUCH ABOUT DIFFERENT THINGS: NEARLY EVERY DAY
6. BECOMING EASILY ANNOYED OR IRRITABLE: MORE THAN HALF THE DAYS
GAD7 TOTAL SCORE: 14
8. IF YOU CHECKED OFF ANY PROBLEMS, HOW DIFFICULT HAVE THESE MADE IT FOR YOU TO DO YOUR WORK, TAKE CARE OF THINGS AT HOME, OR GET ALONG WITH OTHER PEOPLE?: VERY DIFFICULT
1. FEELING NERVOUS, ANXIOUS, OR ON EDGE: MORE THAN HALF THE DAYS
IF YOU CHECKED OFF ANY PROBLEMS ON THIS QUESTIONNAIRE, HOW DIFFICULT HAVE THESE PROBLEMS MADE IT FOR YOU TO DO YOUR WORK, TAKE CARE OF THINGS AT HOME, OR GET ALONG WITH OTHER PEOPLE: VERY DIFFICULT
5. BEING SO RESTLESS THAT IT IS HARD TO SIT STILL: SEVERAL DAYS
GAD7 TOTAL SCORE: 14
7. FEELING AFRAID AS IF SOMETHING AWFUL MIGHT HAPPEN: NOT AT ALL
4. TROUBLE RELAXING: NEARLY EVERY DAY
2. NOT BEING ABLE TO STOP OR CONTROL WORRYING: NEARLY EVERY DAY

## 2023-06-26 ASSESSMENT — ASTHMA QUESTIONNAIRES
QUESTION_5 LAST FOUR WEEKS HOW WOULD YOU RATE YOUR ASTHMA CONTROL: WELL CONTROLLED
QUESTION_2 LAST FOUR WEEKS HOW OFTEN HAVE YOU HAD SHORTNESS OF BREATH: ONCE OR TWICE A WEEK
ACT_TOTALSCORE: 22
QUESTION_4 LAST FOUR WEEKS HOW OFTEN HAVE YOU USED YOUR RESCUE INHALER OR NEBULIZER MEDICATION (SUCH AS ALBUTEROL): NOT AT ALL
QUESTION_1 LAST FOUR WEEKS HOW MUCH OF THE TIME DID YOUR ASTHMA KEEP YOU FROM GETTING AS MUCH DONE AT WORK, SCHOOL OR AT HOME: A LITTLE OF THE TIME
ACT_TOTALSCORE: 22
QUESTION_3 LAST FOUR WEEKS HOW OFTEN DID YOUR ASTHMA SYMPTOMS (WHEEZING, COUGHING, SHORTNESS OF BREATH, CHEST TIGHTNESS OR PAIN) WAKE YOU UP AT NIGHT OR EARLIER THAN USUAL IN THE MORNING: NOT AT ALL

## 2023-06-26 ASSESSMENT — PAIN SCALES - GENERAL: PAINLEVEL: MODERATE PAIN (4)

## 2023-06-26 NOTE — NURSING NOTE
Patient presents to clinic for follow up with ongoing vaginal discharge, abdomin pain, itchiness, painful intercourse.  She states this has been ongoing for a couple months.    Medication Rec Complete  Gerri Gallardo LPN............6/26/2023 4:10 PM

## 2023-06-26 NOTE — PROGRESS NOTES
"  Assessment & Plan     Vaginal discharge  - Wet Prep, Genital  - UA Macroscopic with reflex to Microscopic and Culture  - GC/Chlamydia by PCR    Dysuria  - Wet Prep, Genital  - UA Macroscopic with reflex to Microscopic and Culture  - GC/Chlamydia by PCR    Bacterial vaginosis  Recurrent. Has had three episodes in the last 2 months. If on going pain with intercourse despite treatment of BV, recommend follow back with gyn  - clindamycin (CLEOCIN) 2 % vaginal cream; Place 1 applicator vaginally At Bedtime for 7 days             BMI:   Estimated body mass index is 25.13 kg/m  as calculated from the following:    Height as of this encounter: 1.473 m (4' 10\").    Weight as of this encounter: 54.5 kg (120 lb 4 oz).           No follow-ups on file.    Tahco Cabrera MD  [facility] AND [facility]   Lilliana is a 17 year old, presenting for the following health issues:  Follow Up (Ongoing vaginal discharge, abdomin pain, itchiness, painful intercourse)        5/1/2023    10:59 AM   Additional Questions   Roomed by yonny   Accompanied by self     History of Present Illness       Reason for visit:  Vaginal issues  Symptom onset:  More than a month  Symptoms include:  Pain during intercourse, constipation, tense muscles, birning itching, abnormal discharge  Symptom intensity:  Mild  Symptom progression:  Staying the same  Had these symptoms before:  Yes  Has tried/received treatment for these symptoms:  Yes  Previous treatment was successful:  No    She eats 0-1 servings of fruits and vegetables daily.She consumes 1 sweetened beverage(s) daily.She exercises with enough effort to increase her heart rate 10 to 19 minutes per day.  She exercises with enough effort to increase her heart rate 4 days per week.   She is taking medications regularly.    Today's PHQ-9         PHQ-9 Total Score: 2    PHQ-9 Q9 Thoughts of better off dead/self-harm past 2 weeks :   Not at all    How difficult have these problems " "made it for you to do your work, take care of things at home, or get along with other people: Somewhat difficult  Today's SONIDO-7 Score: 14     - vaginal burning, itching, pain with intercourse, lower abdominal cramping   - Has been on going for months, has had treatment for yeast and bv recently   - most recent episode started 2 weeks ago   - Takes tylenol   - No vaginal dryness, period irregular w/ IUD             Review of Systems   Constitutional, HEENT, cardiovascular, pulmonary, gi and gu systems are negative, except as otherwise noted.      Objective    BP 98/62 (BP Location: Right arm, Patient Position: Sitting, Cuff Size: Adult Regular)   Pulse 80   Temp 98  F (36.7  C) (Tympanic)   Resp 18   Ht 1.473 m (4' 10\")   Wt 54.5 kg (120 lb 4 oz)   LMP  (LMP Unknown)   SpO2 98%   BMI 25.13 kg/m    Body mass index is 25.13 kg/m .  Physical Exam   GENERAL: healthy, alert and no distress   (female): normal female external genitalia, normal urethral meatus, vaginal mucosa, normal cervix/adnexa/uterus without masses. Thin white discharge. IUD strings in cervix   Results for orders placed or performed in visit on 06/26/23 (from the past 24 hour(s))   Wet Prep, Genital    Specimen: Vagina; Swab   Result Value Ref Range    Trichomonas Absent Absent    Yeast Absent Absent    Clue Cells Present (A) Absent    WBCs/high power field 3+ (A) None   UA Macroscopic with reflex to Microscopic and Culture    Specimen: Urine, Clean Catch   Result Value Ref Range    Color Urine Light Yellow Colorless, Straw, Light Yellow, Yellow    Appearance Urine Clear Clear    Glucose Urine Negative Negative mg/dL    Bilirubin Urine Negative Negative    Ketones Urine Negative Negative mg/dL    Specific Gravity Urine 1.024 1.000 - 1.030    Blood Urine Negative Negative    pH Urine 6.0 5.0 - 9.0    Protein Albumin Urine Negative Negative mg/dL    Urobilinogen Urine Normal Normal, 2.0 mg/dL    Nitrite Urine Negative Negative    Leukocyte " Esterase Urine Negative Negative    Narrative    Microscopic not indicated

## 2023-07-31 ENCOUNTER — OFFICE VISIT (OUTPATIENT)
Dept: FAMILY MEDICINE | Facility: OTHER | Age: 18
End: 2023-07-31
Attending: STUDENT IN AN ORGANIZED HEALTH CARE EDUCATION/TRAINING PROGRAM
Payer: COMMERCIAL

## 2023-07-31 VITALS
HEIGHT: 58 IN | TEMPERATURE: 99.3 F | DIASTOLIC BLOOD PRESSURE: 60 MMHG | HEART RATE: 92 BPM | WEIGHT: 120.38 LBS | OXYGEN SATURATION: 98 % | SYSTOLIC BLOOD PRESSURE: 102 MMHG | RESPIRATION RATE: 16 BRPM | BODY MASS INDEX: 25.27 KG/M2

## 2023-07-31 DIAGNOSIS — B96.89 BACTERIAL VAGINOSIS: ICD-10-CM

## 2023-07-31 DIAGNOSIS — N76.0 BACTERIAL VAGINOSIS: ICD-10-CM

## 2023-07-31 DIAGNOSIS — K59.00 CONSTIPATION, UNSPECIFIED CONSTIPATION TYPE: Primary | ICD-10-CM

## 2023-07-31 PROCEDURE — 99213 OFFICE O/P EST LOW 20 MIN: CPT | Performed by: STUDENT IN AN ORGANIZED HEALTH CARE EDUCATION/TRAINING PROGRAM

## 2023-07-31 ASSESSMENT — ANXIETY QUESTIONNAIRES
GAD7 TOTAL SCORE: 6
3. WORRYING TOO MUCH ABOUT DIFFERENT THINGS: SEVERAL DAYS
8. IF YOU CHECKED OFF ANY PROBLEMS, HOW DIFFICULT HAVE THESE MADE IT FOR YOU TO DO YOUR WORK, TAKE CARE OF THINGS AT HOME, OR GET ALONG WITH OTHER PEOPLE?: SOMEWHAT DIFFICULT
1. FEELING NERVOUS, ANXIOUS, OR ON EDGE: SEVERAL DAYS
7. FEELING AFRAID AS IF SOMETHING AWFUL MIGHT HAPPEN: NOT AT ALL
2. NOT BEING ABLE TO STOP OR CONTROL WORRYING: SEVERAL DAYS
7. FEELING AFRAID AS IF SOMETHING AWFUL MIGHT HAPPEN: NOT AT ALL
GAD7 TOTAL SCORE: 6
IF YOU CHECKED OFF ANY PROBLEMS ON THIS QUESTIONNAIRE, HOW DIFFICULT HAVE THESE PROBLEMS MADE IT FOR YOU TO DO YOUR WORK, TAKE CARE OF THINGS AT HOME, OR GET ALONG WITH OTHER PEOPLE: SOMEWHAT DIFFICULT
4. TROUBLE RELAXING: MORE THAN HALF THE DAYS
5. BEING SO RESTLESS THAT IT IS HARD TO SIT STILL: NOT AT ALL
6. BECOMING EASILY ANNOYED OR IRRITABLE: SEVERAL DAYS
GAD7 TOTAL SCORE: 6

## 2023-07-31 ASSESSMENT — PAIN SCALES - GENERAL: PAINLEVEL: MODERATE PAIN (4)

## 2023-07-31 NOTE — PROGRESS NOTES
"  Assessment & Plan     Constipation, unspecified constipation type  Take miralax 2 capfuls every 2 hours until fully cleared out   Then maintenance would be adjusting daily miralax (increase or decrease by 1 capful/day) to a goal of one soft stool/day   Drink lots of water  Increase fiber intake    Bacterial vaginosis  Has had recurrent BV, symptoms seem to have resolved so after last course of abx              BMI:   Estimated body mass index is 25.16 kg/m  as calculated from the following:    Height as of this encounter: 1.473 m (4' 10\").    Weight as of this encounter: 54.6 kg (120 lb 6 oz).           No follow-ups on file.    Tacho Cabrera MD  [facility]    Michelle Tijerina is a 17 year old, presenting for the following health issues:  Follow Up (Bacteria vaginosis )      History of Present Illness       Reason for visit:  BV retest and digestive issues    She eats 0-1 servings of fruits and vegetables daily.She consumes 1 sweetened beverage(s) daily.She exercises with enough effort to increase her heart rate 20 to 29 minutes per day.  She exercises with enough effort to increase her heart rate 4 days per week.   She is taking medications regularly.       Follow up recurrent BV  - after lass course of abx, symptoms resolved  - no longer having pain  - no discharge changes     Constipation   - lower abd pain and cramping  - tried miralax, stool softener, prune juice, etc.               Review of Systems   Constitutional, HEENT, cardiovascular, pulmonary, gi and gu systems are negative, except as otherwise noted.      Objective    /60 (BP Location: Right arm, Patient Position: Sitting, Cuff Size: Adult Regular)   Pulse 92   Temp 99.3  F (37.4  C) (Tympanic)   Resp 16   Ht 1.473 m (4' 10\")   Wt 54.6 kg (120 lb 6 oz)   SpO2 98%   BMI 25.16 kg/m    Body mass index is 25.16 kg/m .  Physical Exam   GENERAL: healthy, alert and no distress  ABDOMEN: soft, nontender, no " hepatosplenomegaly, no masses and bowel sounds normal

## 2023-07-31 NOTE — PATIENT INSTRUCTIONS
Take miralax 2 capfuls every 2 hours until fully cleared out   Then maintenance would be adjusting daily miralax (increase or decrease by 1 capful/day) to a goal of one soft stool/day   Drink lots of water

## 2023-07-31 NOTE — NURSING NOTE
Patient presents to clinic for follow up after episode of bacteria vaginosis.  Medication Rec Complete  Gerri Gallardo LPN............7/31/2023 3:55 PM

## 2023-08-14 ENCOUNTER — E-VISIT (OUTPATIENT)
Dept: FAMILY MEDICINE | Facility: OTHER | Age: 18
End: 2023-08-14
Payer: COMMERCIAL

## 2023-08-14 DIAGNOSIS — F33.2 MAJOR DEPRESSIVE DISORDER, RECURRENT SEVERE WITHOUT PSYCHOTIC FEATURES (H): Primary | ICD-10-CM

## 2023-08-14 DIAGNOSIS — G47.00 INSOMNIA, UNSPECIFIED TYPE: ICD-10-CM

## 2023-08-14 DIAGNOSIS — F41.9 ANXIETY: ICD-10-CM

## 2023-08-14 PROCEDURE — 99422 OL DIG E/M SVC 11-20 MIN: CPT | Performed by: FAMILY MEDICINE

## 2023-08-14 ASSESSMENT — ANXIETY QUESTIONNAIRES
5. BEING SO RESTLESS THAT IT IS HARD TO SIT STILL: SEVERAL DAYS
GAD7 TOTAL SCORE: 16
7. FEELING AFRAID AS IF SOMETHING AWFUL MIGHT HAPPEN: SEVERAL DAYS
6. BECOMING EASILY ANNOYED OR IRRITABLE: MORE THAN HALF THE DAYS
4. TROUBLE RELAXING: NEARLY EVERY DAY
GAD7 TOTAL SCORE: 16
2. NOT BEING ABLE TO STOP OR CONTROL WORRYING: NEARLY EVERY DAY
1. FEELING NERVOUS, ANXIOUS, OR ON EDGE: NEARLY EVERY DAY
3. WORRYING TOO MUCH ABOUT DIFFERENT THINGS: NEARLY EVERY DAY

## 2023-08-14 ASSESSMENT — PATIENT HEALTH QUESTIONNAIRE - PHQ9
10. IF YOU CHECKED OFF ANY PROBLEMS, HOW DIFFICULT HAVE THESE PROBLEMS MADE IT FOR YOU TO DO YOUR WORK, TAKE CARE OF THINGS AT HOME, OR GET ALONG WITH OTHER PEOPLE: VERY DIFFICULT
SUM OF ALL RESPONSES TO PHQ QUESTIONS 1-9: 19
SUM OF ALL RESPONSES TO PHQ QUESTIONS 1-9: 19

## 2023-08-15 RX ORDER — TRAZODONE HYDROCHLORIDE 50 MG/1
50 TABLET, FILM COATED ORAL AT BEDTIME
Qty: 30 TABLET | Refills: 1 | Status: ON HOLD | OUTPATIENT
Start: 2023-08-15 | End: 2024-01-10

## 2023-08-15 RX ORDER — ESCITALOPRAM OXALATE 10 MG/1
10 TABLET ORAL DAILY
Qty: 30 TABLET | Refills: 11 | Status: SHIPPED | OUTPATIENT
Start: 2023-08-15 | End: 2023-09-07

## 2023-08-15 ASSESSMENT — ANXIETY QUESTIONNAIRES: GAD7 TOTAL SCORE: 16

## 2023-08-15 ASSESSMENT — PATIENT HEALTH QUESTIONNAIRE - PHQ9: SUM OF ALL RESPONSES TO PHQ QUESTIONS 1-9: 19

## 2023-09-06 ASSESSMENT — PATIENT HEALTH QUESTIONNAIRE - PHQ9
10. IF YOU CHECKED OFF ANY PROBLEMS, HOW DIFFICULT HAVE THESE PROBLEMS MADE IT FOR YOU TO DO YOUR WORK, TAKE CARE OF THINGS AT HOME, OR GET ALONG WITH OTHER PEOPLE: VERY DIFFICULT
SUM OF ALL RESPONSES TO PHQ QUESTIONS 1-9: 14
SUM OF ALL RESPONSES TO PHQ QUESTIONS 1-9: 14

## 2023-09-06 ASSESSMENT — ANXIETY QUESTIONNAIRES
5. BEING SO RESTLESS THAT IT IS HARD TO SIT STILL: SEVERAL DAYS
GAD7 TOTAL SCORE: 15
IF YOU CHECKED OFF ANY PROBLEMS ON THIS QUESTIONNAIRE, HOW DIFFICULT HAVE THESE PROBLEMS MADE IT FOR YOU TO DO YOUR WORK, TAKE CARE OF THINGS AT HOME, OR GET ALONG WITH OTHER PEOPLE: VERY DIFFICULT
4. TROUBLE RELAXING: NEARLY EVERY DAY
3. WORRYING TOO MUCH ABOUT DIFFERENT THINGS: NEARLY EVERY DAY
1. FEELING NERVOUS, ANXIOUS, OR ON EDGE: NEARLY EVERY DAY
7. FEELING AFRAID AS IF SOMETHING AWFUL MIGHT HAPPEN: NOT AT ALL
6. BECOMING EASILY ANNOYED OR IRRITABLE: MORE THAN HALF THE DAYS
GAD7 TOTAL SCORE: 15
2. NOT BEING ABLE TO STOP OR CONTROL WORRYING: NEARLY EVERY DAY

## 2023-09-07 ENCOUNTER — OFFICE VISIT (OUTPATIENT)
Dept: FAMILY MEDICINE | Facility: OTHER | Age: 18
End: 2023-09-07
Attending: FAMILY MEDICINE
Payer: COMMERCIAL

## 2023-09-07 VITALS — BODY MASS INDEX: 25.16 KG/M2 | HEIGHT: 58 IN

## 2023-09-07 DIAGNOSIS — F33.2 MAJOR DEPRESSIVE DISORDER, RECURRENT SEVERE WITHOUT PSYCHOTIC FEATURES (H): Primary | ICD-10-CM

## 2023-09-07 DIAGNOSIS — F41.9 ANXIETY: ICD-10-CM

## 2023-09-07 DIAGNOSIS — G47.00 INSOMNIA, UNSPECIFIED TYPE: ICD-10-CM

## 2023-09-07 PROCEDURE — 99214 OFFICE O/P EST MOD 30 MIN: CPT | Performed by: FAMILY MEDICINE

## 2023-09-07 RX ORDER — ESCITALOPRAM OXALATE 20 MG/1
20 TABLET ORAL DAILY
Qty: 30 TABLET | Refills: 11 | Status: SHIPPED | OUTPATIENT
Start: 2023-09-07 | End: 2024-03-01

## 2023-09-07 NOTE — PROGRESS NOTES
Answers submitted by the patient for this visit:  Patient Health Questionnaire (Submitted on 9/6/2023)  If you checked off any problems, how difficult have these problems made it for you to do your work, take care of things at home, or get along with other people?: Very difficult  PHQ9 TOTAL SCORE: 14  SONIDO-7 (Submitted on 9/6/2023)  SONIDO 7 TOTAL SCORE: 15  Depression / Anxiety Questionnaire (Submitted on 9/6/2023)  Chief Complaint: Chronic problems general questions HPI Form  Depression/Anxiety: Depression & Anxiety  Depression & Anxiety (Submitted on 9/6/2023)  Chief Complaint: Chronic problems general questions HPI Form  Status since last visit:: bad  Anxiety since last: : bad  Other associated symptoms of depression:: No  Other associated symotome: : No  Significant life event: : No  Anxious:: Yes  Current substance use:: No  General Questionnaire (Submitted on 9/6/2023)  Chief Complaint: Chronic problems general questions HPI Form  How many servings of fruits and vegetables do you eat daily?: 0-1  On average, how many sweetened beverages do you drink each day (Examples: soda, juice, sweet tea, etc.  Do NOT count diet or artificially sweetened beverages)?: 1  How many minutes a day do you exercise enough to make your heart beat faster?: 30 to 60  How many days a week do you exercise enough to make your heart beat faster?: 5  How many days per week do you miss taking your medication?: 0        Michelle Tijerina is a 17 year old, presenting for the following health issues:  Recheck Medication (Lexapro- no change, Voltaren- helping, Trazodone- helping a lot.)      History of Present Illness       Mental Health Follow-up:  Patient presents to follow-up on Depression & Anxiety.Patient's depression since last visit has been:  Bad  The patient is not having other symptoms associated with depression.  Patient's anxiety since last visit has been:  Bad  The patient is not having other symptoms associated with  "anxiety.  Any significant life events: No  Patient is feeling anxious or having panic attacks.  Patient has no concerns about alcohol or drug use.    She eats 0-1 servings of fruits and vegetables daily.She consumes 1 sweetened beverage(s) daily.She exercises with enough effort to increase her heart rate 30 to 60 minutes per day.  She exercises with enough effort to increase her heart rate 5 days per week.   She is taking medications regularly.               Review of Systems         Objective    Ht 1.473 m (4' 10\")   LMP 09/04/2023   Breastfeeding No   BMI 25.16 kg/m    Body mass index is 25.16 kg/m .  Physical Exam                       "

## 2023-09-07 NOTE — NURSING NOTE
"Chief Complaint   Patient presents with    Recheck Medication     Lexapro- no change, Voltaren- helping, Trazodone- helping a lot.         Initial Ht 1.473 m (4' 10\")   LMP 09/04/2023   Breastfeeding No   BMI 25.16 kg/m   Estimated body mass index is 25.16 kg/m  as calculated from the following:    Height as of this encounter: 1.473 m (4' 10\").    Weight as of 7/31/23: 54.6 kg (120 lb 6 oz).         Norma J. Gosselin, LPN     "

## 2023-09-07 NOTE — PROGRESS NOTES
"Nursing Notes:   Gosselin, Norma J., LPN  9/7/2023  8:41 AM  Sign at exiting of workspace  Chief Complaint   Patient presents with    Recheck Medication     Lexapro- no change, Voltaren- helping, Trazodone- helping a lot.         Initial Ht 1.473 m (4' 10\")   LMP 09/04/2023   Breastfeeding No   BMI 25.16 kg/m   Estimated body mass index is 25.16 kg/m  as calculated from the following:    Height as of this encounter: 1.473 m (4' 10\").    Weight as of 7/31/23: 54.6 kg (120 lb 6 oz).         Norma J. Gosselin, LPN   SUBJECTIVE:  Lilliana Viera  is a 17 year old female Who comes in today for follow-up of depression.  She did an E-visit a few weeks ago, and she had resumed taking 10 mg of Lexapro.  We added some trazodone at bedtime.  The trazodone has helped with sleep.  She feels that the Lexapro has not done much yet.        7/31/2023     2:16 PM 8/14/2023    11:32 PM 9/6/2023     8:10 PM   PHQ   PHQ-9 Total Score 4 19 14   Q9: Thoughts of better off dead/self-harm past 2 weeks Not at all More than half the days More than half the days   F/U: Thoughts of suicide or self-harm  Yes Yes   F/U: Self harm-plan  Yes Yes   F/U: Self-harm action  No No   F/U: Safety concerns  No No         7/31/2023     2:17 PM 8/14/2023    11:32 PM 9/6/2023     8:12 PM   SONIDO-7 SCORE   Total Score 6 (mild anxiety) 16 (severe anxiety) 15 (severe anxiety)   Total Score 6 16 15     She is back in school. She quit her job at [employer] at the end of August.  She wants to go into in[employer]par[employer] at [place].  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  She is taking generals at [employer] and taking 17 credits. She is living at home.     She is dating a curt from [place] who goes to [employer].  His mom is the  for volleyball. They have dated for six months.       Past Medical, Family, and Social History reviewed and updated as noted below.   ROS is negative except as noted above       No Known Allergies,   Family History   Problem Relation Age of " "Onset    Asthma Brother         Asthma,winter   ,   Current Outpatient Medications   Medication    albuterol (PROAIR HFA/PROVENTIL HFA/VENTOLIN HFA) 108 (90 Base) MCG/ACT inhaler    ALPRAZolam (XANAX) 0.25 MG tablet    diclofenac (VOLTAREN) 1 % topical gel    escitalopram (LEXAPRO) 20 MG tablet    naproxen (NAPROSYN) 500 MG tablet    traZODone (DESYREL) 50 MG tablet     No current facility-administered medications for this visit.   ,   Past Medical History:   Diagnosis Date    Closed fracture of phalanx of finger     2015    Migraine without status migrainosus, not intractable     No Comments Provided   ,   Patient Active Problem List    Diagnosis Date Noted    Occipital neuralgia of left side 03/09/2022     Priority: Medium    Migraine with aura and without status migrainosus, not intractable 03/09/2022     Priority: Medium    Major depressive disorder, single episode, severe without psychotic features (H) 02/09/2022     Priority: Medium    Anxiety 02/09/2022     Priority: Medium    Asthma 02/17/2010     Priority: Medium   ,   Past Surgical History:   Procedure Laterality Date    ARTHROSCOPIC RECONSTRUCTION ANTERIOR CRUCIATE LIGAMENT BONE TENDON BONE AUTOGRAFT Left 2/15/2019    Procedure: Examination Under Anesthesia Left Knee, Left Anterior Cruciate Ligament Reconstruction, Bone Tendon Bone Autograft;  Surgeon: Himanshu Cortez MD;  Location: [facility] OR    OTHER SURGICAL HISTORY      [id number],NO PREVIOUS SURGERY    and   Social History     Tobacco Use    Smoking status: Never    Smokeless tobacco: Never   Substance Use Topics    Alcohol use: No     Alcohol/week: 0.0 standard drinks of alcohol     OBJECTIVE:  Ht 1.473 m (4' 10\")   LMP 09/04/2023   Breastfeeding No   BMI 25.16 kg/m     EXAM:  Alert and cooperative, no distress.  Affect is mildly restricted but she does smile and does not become tearful.  No formal thought disorder.  No active homicidal or suicidal ideations.  ASSESSMENT/Plan :    Lilliana was " seen today for recheck medication.    Diagnoses and all orders for this visit:    Major depressive disorder, recurrent severe without psychotic features (H)  -     escitalopram (LEXAPRO) 20 MG tablet; Take 1 tablet (20 mg) by mouth daily    Anxiety  -     escitalopram (LEXAPRO) 20 MG tablet; Take 1 tablet (20 mg) by mouth daily    Insomnia, unspecified type      Recommended that she take her trazodone on a regular basis as she has only been using it as needed.  This should help to recruit some norepinephrine effects.  We will increase Lexapro to 20 mg daily.  Discussed potentially seeing a counselor but at this point she feels that she wishes to hold off on that.  Recommend follow-up in 1 month, sooner if needed.    A total of 30 minutes was spent with the patient, reviewing records, tests, ordering medications, tests or procedures and documenting clinical information in the EHR.     Domenic Toure MD        Answers submitted by the patient for this visit:  Patient Health Questionnaire (Submitted on 9/6/2023)  If you checked off any problems, how difficult have these problems made it for you to do your work, take care of things at home, or get along with other people?: Very difficult  PHQ9 TOTAL SCORE: 14  SONIDO-7 (Submitted on 9/6/2023)  SONIDO 7 TOTAL SCORE: 15  Depression / Anxiety Questionnaire (Submitted on 9/6/2023)  Chief Complaint: Chronic problems general questions HPI Form  Depression/Anxiety: Depression & Anxiety  Depression & Anxiety (Submitted on 9/6/2023)  Chief Complaint: Chronic problems general questions HPI Form  Status since last visit:: bad  Anxiety since last: : bad  Other associated symptoms of depression:: No  Other associated symotome: : No  Significant life event: : No  Anxious:: Yes  Current substance use:: No  General Questionnaire (Submitted on 9/6/2023)  Chief Complaint: Chronic problems general questions HPI Form  How many servings of fruits and vegetables do you eat daily?: 0-1  On  average, how many sweetened beverages do you drink each day (Examples: soda, juice, sweet tea, etc.  Do NOT count diet or artificially sweetened beverages)?: 1  How many minutes a day do you exercise enough to make your heart beat faster?: 30 to 60  How many days a week do you exercise enough to make your heart beat faster?: 5  How many days per week do you miss taking your medication?: 0

## 2023-09-12 ENCOUNTER — E-VISIT (OUTPATIENT)
Dept: FAMILY MEDICINE | Facility: OTHER | Age: 18
End: 2023-09-12
Payer: COMMERCIAL

## 2023-09-12 DIAGNOSIS — N76.0 ACUTE VAGINITIS: Primary | ICD-10-CM

## 2023-09-12 PROCEDURE — 99421 OL DIG E/M SVC 5-10 MIN: CPT | Performed by: STUDENT IN AN ORGANIZED HEALTH CARE EDUCATION/TRAINING PROGRAM

## 2023-09-13 NOTE — TELEPHONE ENCOUNTER
Disclaimer:  This note has been generated using voice recognition software.  There may be type of graft focal areas that have been missed during a proof reading      Subjective:      Patient ID: Nacho Del Angel is a 68 y.o. male.    Chief Complaint: Low-back Pain      Pain  This is a chronic problem. The current episode started more than 1 year ago. The problem occurs daily. The problem has been waxing and waning. Associated symptoms include arthralgias and neck pain. Pertinent negatives include no change in bowel habit, chest pain, chills, coughing, diaphoresis, fever, rash, sore throat, vertigo or vomiting.     Review of Systems   Constitutional: Negative for activity change, appetite change, chills, diaphoresis and fever.   HENT: Negative for drooling, ear discharge, ear pain, facial swelling, nosebleeds, sore throat, trouble swallowing, voice change and goiter.    Eyes: Negative for photophobia, pain, discharge, redness and visual disturbance.   Respiratory: Negative for apnea, cough, choking, chest tightness, shortness of breath, wheezing and stridor.    Cardiovascular: Negative for chest pain, palpitations and leg swelling.   Gastrointestinal: Negative for abdominal distention, change in bowel habit, diarrhea, rectal pain, vomiting, fecal incontinence and change in bowel habit.   Endocrine: Negative for cold intolerance, heat intolerance, polydipsia, polyphagia and polyuria.   Genitourinary: Negative for bladder incontinence, dysuria, flank pain and frequency.   Musculoskeletal: Positive for arthralgias, back pain, leg pain and neck pain.   Integumentary:  Negative for color change, pallor and rash.   Neurological: Negative for dizziness, vertigo, seizures, syncope, facial asymmetry, speech difficulty, light-headedness, disturbances in coordination, memory loss and coordination difficulties.   Hematological: Negative for adenopathy. Does not bruise/bleed easily.   Psychiatric/Behavioral: Negative for  Provider E-Visit time total (minutes): 3   "agitation, behavioral problems, confusion, decreased concentration, dysphoric mood, hallucinations, self-injury and suicidal ideas. The patient is not nervous/anxious and is not hyperactive.             Objective:  Vitals:    03/24/22 1354   BP: 134/75   Pulse: 82   Resp: 19   Weight: 77.6 kg (171 lb)   Height: 5' 10" (1.778 m)   PainSc:   6         Physical Exam  Vitals and nursing note reviewed. Exam conducted with a chaperone present.   Constitutional:       General: He is awake. He is not in acute distress.     Appearance: Normal appearance.   HENT:      Head: Normocephalic and atraumatic.      Nose: Nose normal.      Mouth/Throat:      Mouth: Mucous membranes are moist.      Pharynx: Oropharynx is clear.   Eyes:      Conjunctiva/sclera: Conjunctivae normal.      Pupils: Pupils are equal, round, and reactive to light.   Cardiovascular:      Rate and Rhythm: Normal rate.   Pulmonary:      Effort: Pulmonary effort is normal. No respiratory distress.   Abdominal:      Palpations: Abdomen is soft.   Musculoskeletal:         General: Normal range of motion.      Cervical back: Normal range of motion and neck supple. Tenderness present.      Thoracic back: Tenderness present.      Lumbar back: Tenderness present.   Skin:     General: Skin is warm and dry.      Coloration: Skin is not jaundiced.      Findings: No bruising or lesion.   Neurological:      General: No focal deficit present.      Mental Status: He is alert and oriented to person, place, and time. Mental status is at baseline.      Cranial Nerves: Cranial nerves are intact. No cranial nerve deficit (II-XII).   Psychiatric:         Mood and Affect: Mood normal.         Behavior: Behavior normal. Behavior is cooperative.         Thought Content: Thought content normal.           No orders of the defined types were placed in this encounter.       X-Ray Chest PA And Lateral  Narrative: EXAMINATION:  XR CHEST PA AND LATERAL    CLINICAL HISTORY:  Encounter for " preprocedural laboratory examination    TECHNIQUE:  PA and lateral views of the chest were performed.    COMPARISON:  None    FINDINGS:  Heart size normal.  Lungs clear.  No pneumothorax or pleural effusion.  Pulmonary vessels show normal caliber.  Bones intact.  Impression: No acute findings.    Electronically signed by: Long Irvin  Date:    02/07/2022  Time:    08:00       Lab Visit on 03/24/2022   Component Date Value Ref Range Status    WBC 03/24/2022 6.89  4.50 - 11.00 K/uL Final    RBC 03/24/2022 3.92 (A) 4.60 - 6.20 M/uL Final    Hemoglobin 03/24/2022 12.5 (A) 13.5 - 18.0 g/dL Final    Hematocrit 03/24/2022 37.0 (A) 40.0 - 54.0 % Final    MCV 03/24/2022 94.4  80.0 - 96.0 fL Final    MCH 03/24/2022 31.9 (A) 27.0 - 31.0 pg Final    MCHC 03/24/2022 33.8  32.0 - 36.0 g/dL Final    RDW 03/24/2022 13.1  11.5 - 14.5 % Final    Platelet Count 03/24/2022 428 (A) 150 - 400 K/uL Final    MPV 03/24/2022 9.4  9.4 - 12.4 fL Final    Neutrophils % 03/24/2022 68.3 (A) 53.0 - 65.0 % Final    Lymphocytes % 03/24/2022 17.4 (A) 27.0 - 41.0 % Final    Monocytes % 03/24/2022 5.5  2.0 - 6.0 % Final    Eosinophils % 03/24/2022 7.8 (A) 1.0 - 4.0 % Final    Basophils % 03/24/2022 0.7  0.0 - 1.0 % Final    Immature Granulocytes % 03/24/2022 0.3  0.0 - 0.4 % Final    nRBC, Auto 03/24/2022 0.0  <=0.0 % Final    Neutrophils, Abs 03/24/2022 4.70  1.80 - 7.70 K/uL Final    Lymphocytes, Absolute 03/24/2022 1.20  1.00 - 4.80 K/uL Final    Monocytes, Absolute 03/24/2022 0.38  0.00 - 0.80 K/uL Final    Eosinophils, Absolute 03/24/2022 0.54 (A) 0.00 - 0.50 K/uL Final    Basophils, Absolute 03/24/2022 0.05  0.00 - 0.20 K/uL Final    Immature Granulocytes, Absolute 03/24/2022 0.02  0.00 - 0.04 K/uL Final    nRBC, Absolute 03/24/2022 0.00  <=0.00 x10e3/uL Final    Diff Type 03/24/2022 Auto   Final   Office Visit on 03/24/2022   Component Date Value Ref Range Status    Color, UA 03/24/2022 Yellow  Colorless, Straw, Yellow,  Dark Yellow Final    Clarity, UA 03/24/2022 Clear  Clear Final    pH, UA 03/24/2022 6.5  5.0, 5.5, 6.0, 6.5, 7.0, 7.5, 8.0 pH Units Final    Leukocytes, UA 03/24/2022 Negative  Negative Final    Nitrites, UA 03/24/2022 Negative  Negative Final    Protein, UA 03/24/2022 Negative  Negative Final    Glucose, UA 03/24/2022 Negative  Negative mg/dL Final    Ketones, UA 03/24/2022 Negative  Negative, Trace mg/dL Final    Urobilinogen, UA 03/24/2022 4.0 (A) 0.2, 1.0 mg/dL Final    Bilirubin, UA 03/24/2022 Negative  Negative Final    Blood, UA 03/24/2022 Negative  Negative Final    Specific Gravity, UA 03/24/2022 1.025  <=1.005, 1.010, 1.015, 1.020, 1.025, 1.030 Final   Lab Visit on 03/14/2022   Component Date Value Ref Range Status    Protime 03/14/2022 23.7 (A) 12.0 - 14.7 Final    INR 03/14/2022 2.0  0.0 - 3.3 Final     Acceptable 03/14/2022 Yes   Final   Office Visit on 03/11/2022   Component Date Value Ref Range Status    Protime 03/11/2022 13.7  12.0 - 14.7 Final    INR 03/11/2022 1.1  0.0 - 3.3 Final     Acceptable 03/11/2022 Yes   Final   Admission on 03/07/2022, Discharged on 03/07/2022   Component Date Value Ref Range Status    Case Report 03/07/2022    Final                    Value:Surgical Pathology                                Case: [id number]                                   Authorizing Provider:  Kevin Shaw DO        Collected:           03/07/2022 09:25 AM          Ordering Location:     [facility]   Received:            03/07/2022 12:17 PM                                 Services                                                                     Pathologist:           Contreras Mccray MD                                                            Specimen:    Gallbladder                                                                                Final Diagnosis 03/07/2022    Final                    Value:This result contains rich text  formatting which cannot be displayed here.    Comments 03/07/2022    Final                    Value:This result contains rich text formatting which cannot be displayed here.    Gross Description 03/07/2022    Final                    Value:This result contains rich text formatting which cannot be displayed here.    Microscopic Description 03/07/2022    Final                    Value:This result contains rich text formatting which cannot be displayed here.    Laboratory Notes 03/07/2022    Final                    Value:This result contains rich text formatting which cannot be displayed here.   Lab Visit on 02/28/2022   Component Date Value Ref Range Status    Sodium 02/28/2022 133 (A) 136 - 145 mmol/L Final    Potassium 02/28/2022 5.0  3.5 - 5.1 mmol/L Final    Chloride 02/28/2022 103  98 - 107 mmol/L Final    CO2 02/28/2022 27  21 - 32 mmol/L Final    Anion Gap 02/28/2022 8  7 - 16 mmol/L Final    Glucose 02/28/2022 154 (A) 74 - 106 mg/dL Final    BUN 02/28/2022 10  7 - 18 mg/dL Final    Creatinine 02/28/2022 0.97  0.70 - 1.30 mg/dL Final    BUN/Creatinine Ratio 02/28/2022 10  6 - 20 Final    Calcium 02/28/2022 9.0  8.5 - 10.1 mg/dL Final    eGFR 02/28/2022 81  >=60 mL/min/1.73m² Final    WBC 02/28/2022 4.37 (A) 4.50 - 11.00 K/uL Final    RBC 02/28/2022 4.46 (A) 4.60 - 6.20 M/uL Final    Hemoglobin 02/28/2022 14.6  13.5 - 18.0 g/dL Final    Hematocrit 02/28/2022 41.2  40.0 - 54.0 % Final    MCV 02/28/2022 92.4  80.0 - 96.0 fL Final    MCH 02/28/2022 32.7 (A) 27.0 - 31.0 pg Final    MCHC 02/28/2022 35.4  32.0 - 36.0 g/dL Final    RDW 02/28/2022 12.7  11.5 - 14.5 % Final    Platelet Count 02/28/2022 208  150 - 400 K/uL Final    MPV 02/28/2022 10.6  9.4 - 12.4 fL Final    Neutrophils % 02/28/2022 62.5  53.0 - 65.0 % Final    Lymphocytes % 02/28/2022 28.4  27.0 - 41.0 % Final    Monocytes % 02/28/2022 6.4 (A) 2.0 - 6.0 % Final    Eosinophils % 02/28/2022 1.6  1.0 - 4.0 % Final    Basophils  % 02/28/2022 0.9  0.0 - 1.0 % Final    Immature Granulocytes % 02/28/2022 0.2  0.0 - 0.4 % Final    nRBC, Auto 02/28/2022 0.0  <=0.0 % Final    Neutrophils, Abs 02/28/2022 2.73  1.80 - 7.70 K/uL Final    Lymphocytes, Absolute 02/28/2022 1.24  1.00 - 4.80 K/uL Final    Monocytes, Absolute 02/28/2022 0.28  0.00 - 0.80 K/uL Final    Eosinophils, Absolute 02/28/2022 0.07  0.00 - 0.50 K/uL Final    Basophils, Absolute 02/28/2022 0.04  0.00 - 0.20 K/uL Final    Immature Granulocytes, Absolute 02/28/2022 0.01  0.00 - 0.04 K/uL Final    nRBC, Absolute 02/28/2022 0.00  <=0.00 x10e3/uL Final    Diff Type 02/28/2022 Auto   Final    SARS CoV-2 PCR 02/28/2022 Negative  Negative, Invalid Final    Internal Control 02/28/2022 Valid   Final   Lab Visit on 02/07/2022   Component Date Value Ref Range Status    Sodium 02/07/2022 135 (A) 136 - 145 mmol/L Final    Potassium 02/07/2022 4.2  3.5 - 5.1 mmol/L Final    Chloride 02/07/2022 103  98 - 107 mmol/L Final    CO2 02/07/2022 27  21 - 32 mmol/L Final    Anion Gap 02/07/2022 9  7 - 16 mmol/L Final    Glucose 02/07/2022 134 (A) 74 - 106 mg/dL Final    BUN 02/07/2022 9  7 - 18 mg/dL Final    Creatinine 02/07/2022 0.97  0.70 - 1.30 mg/dL Final    BUN/Creatinine Ratio 02/07/2022 9  6 - 20 Final    Calcium 02/07/2022 9.1  8.5 - 10.1 mg/dL Final    eGFR 02/07/2022 81  >=60 mL/min/1.73m² Final    WBC 02/07/2022 5.48  4.50 - 11.00 K/uL Final    RBC 02/07/2022 4.54 (A) 4.60 - 6.20 M/uL Final    Hemoglobin 02/07/2022 14.7  13.5 - 18.0 g/dL Final    Hematocrit 02/07/2022 41.9  40.0 - 54.0 % Final    MCV 02/07/2022 92.3  80.0 - 96.0 fL Final    MCH 02/07/2022 32.4 (A) 27.0 - 31.0 pg Final    MCHC 02/07/2022 35.1  32.0 - 36.0 g/dL Final    RDW 02/07/2022 12.5  11.5 - 14.5 % Final    Platelet Count 02/07/2022 224  150 - 400 K/uL Final    MPV 02/07/2022 10.2  9.4 - 12.4 fL Final    Neutrophils % 02/07/2022 68.2 (A) 53.0 - 65.0 % Final    Lymphocytes % 02/07/2022  21.9 (A) 27.0 - 41.0 % Final    Monocytes % 02/07/2022 7.5 (A) 2.0 - 6.0 % Final    Eosinophils % 02/07/2022 1.3  1.0 - 4.0 % Final    Basophils % 02/07/2022 0.9  0.0 - 1.0 % Final    Immature Granulocytes % 02/07/2022 0.2  0.0 - 0.4 % Final    nRBC, Auto 02/07/2022 0.0  <=0.0 % Final    Neutrophils, Abs 02/07/2022 3.74  1.80 - 7.70 K/uL Final    Lymphocytes, Absolute 02/07/2022 1.20  1.00 - 4.80 K/uL Final    Monocytes, Absolute 02/07/2022 0.41  0.00 - 0.80 K/uL Final    Eosinophils, Absolute 02/07/2022 0.07  0.00 - 0.50 K/uL Final    Basophils, Absolute 02/07/2022 0.05  0.00 - 0.20 K/uL Final    Immature Granulocytes, Absolute 02/07/2022 0.01  0.00 - 0.04 K/uL Final    nRBC, Absolute 02/07/2022 0.00  <=0.00 x10e3/uL Final    Diff Type 02/07/2022 Auto   Final   Admission on 01/17/2022, Discharged on 01/17/2022   Component Date Value Ref Range Status    Amylase 01/17/2022 55  25 - 115 U/L Final    Lipase 01/17/2022 112  73 - 393 U/L Final    Sodium 01/17/2022 135 (A) 136 - 145 mmol/L Final    Potassium 01/17/2022 4.1  3.5 - 5.1 mmol/L Final    Chloride 01/17/2022 101  98 - 107 mmol/L Final    CO2 01/17/2022 22  21 - 32 mmol/L Final    Anion Gap 01/17/2022 16  7 - 16 mmol/L Final    Glucose 01/17/2022 96  74 - 106 mg/dL Final    BUN 01/17/2022 12  7 - 18 mg/dL Final    Creatinine 01/17/2022 0.98  0.70 - 1.30 mg/dL Final    BUN/Creatinine Ratio 01/17/2022 12  6 - 20 Final    Calcium 01/17/2022 8.2 (A) 8.5 - 10.1 mg/dL Final    Total Protein 01/17/2022 7.4  6.4 - 8.2 g/dL Final    Albumin 01/17/2022 3.3 (A) 3.5 - 5.0 g/dL Final    Globulin 01/17/2022 4.1 (A) 2.0 - 4.0 g/dL Final    A/G Ratio 01/17/2022 0.8   Final    Bilirubin, Total 01/17/2022 0.4  0.0 - 1.2 mg/dL Final    Alk Phos 01/17/2022 101  45 - 115 U/L Final    ALT 01/17/2022 23  16 - 61 U/L Final    AST 01/17/2022 28  15 - 37 U/L Final    eGFR 01/17/2022 80  >=60 mL/min/1.73m² Final    Troponin I High Sensitivity 01/17/2022  38.4  <=60.4 pg/mL Final    WBC 01/17/2022 3.84 (A) 4.50 - 11.00 K/uL Final    RBC 01/17/2022 3.99 (A) 4.60 - 6.20 M/uL Final    Hemoglobin 01/17/2022 13.3 (A) 13.5 - 18.0 g/dL Final    Hematocrit 01/17/2022 36.2 (A) 40.0 - 54.0 % Final    MCV 01/17/2022 90.7  80.0 - 96.0 fL Final    MCH 01/17/2022 33.3 (A) 27.0 - 31.0 pg Final    MCHC 01/17/2022 36.7 (A) 32.0 - 36.0 g/dL Final    RDW 01/17/2022 12.4  11.5 - 14.5 % Final    Platelet Count 01/17/2022 186  150 - 400 K/uL Final    MPV 01/17/2022 9.1 (A) 9.4 - 12.4 fL Final    Neutrophils % 01/17/2022 62.5  53.0 - 65.0 % Final    Lymphocytes % 01/17/2022 19.0 (A) 27.0 - 41.0 % Final    Neutrophils, Abs 01/17/2022 2.40  1.80 - 7.70 K/uL Final    Lymphocytes, Absolute 01/17/2022 0.73 (A) 1.00 - 4.80 K/uL Final    Diff Type 01/17/2022 Manual   Final    Monocytes % 01/17/2022 15.4 (A) 2.0 - 6.0 % Final    Eosinophils % 01/17/2022 2.6  1.0 - 4.0 % Final    Basophils % 01/17/2022 0.5  0.0 - 1.0 % Final    Monocytes, Absolute 01/17/2022 0.59  0.00 - 0.80 K/uL Final    Eosinophils, Absolute 01/17/2022 0.10  0.00 - 0.50 K/uL Final    Basophils, Absolute 01/17/2022 0.02  0.00 - 0.20 K/uL Final    Segmented Neutrophils, Man % 01/17/2022 69 (A) 50 - 62 % Final    Bands, Man % 01/17/2022 1  1 - 5 % Final    Lymphocytes, Man % 01/17/2022 19 (A) 27 - 41 % Final    Monocytes, Man % 01/17/2022 8 (A) 2 - 6 % Final    Eosinophils, Man % 01/17/2022 3  1 - 4 % Final    Platelet Morphology 01/17/2022 Normal  Normal Final    RBC Morphology 01/17/2022 Normal   Final   Office Visit on 12/23/2021   Component Date Value Ref Range Status    POC Amphetamines 12/23/2021 Negative  Negative, Inconclusive Final    POC Barbiturates 12/23/2021 Negative  Negative, Inconclusive Final    POC Benzodiazepines 12/23/2021 Negative  Negative, Inconclusive Final    POC Cocaine 12/23/2021 Negative  Negative, Inconclusive Final    POC THC 12/23/2021 Negative  Negative, Inconclusive  Final    POC Methadone 12/23/2021 Negative  Negative, Inconclusive Final    POC Methamphetamine 12/23/2021 Negative  Negative, Inconclusive Final    POC Opiates 12/23/2021 Negative  Negative, Inconclusive Final    POC Oxycodone 12/23/2021 Negative  Negative, Inconclusive Final    POC Phencyclidine 12/23/2021 Negative  Negative, Inconclusive Final    POC Methylenedioxymethamphetamine * 12/23/2021 Negative  Negative, Inconclusive Final    POC Tricyclic Antidepressants 12/23/2021 Negative  Negative, Inconclusive Final    POC Buprenorphine 12/23/2021 Negative   Final     Acceptable 12/23/2021 Yes   Final    POC Temperature (Urine) 12/23/2021 94   Final   Appointment on 11/22/2021   Component Date Value Ref Range Status    Protime 11/22/2021 22.6 (A) 12.0 - 14.7 Final    INR 11/22/2021 1.9  0.0 - 3.3 Final     Acceptable 11/22/2021 Yes   Final   There may be more visits with results that are not included.           Assessment:      1. Lumbosacral radiculopathy    2. Lumbar stenosis with neurogenic claudication    3. Peripheral vascular disease            A's of Opioid Risk Assessment  Activity:Patient can perform ADL.   Analgesia:Patients pain is partially controlled by current medication. Patient has tried OTC medications such as Tylenol and Ibuprofen with out relief.   Adverse Effects: Patient denies constipation or sedation.  Aberrant Behavior:  reviewed with no aberrant drug seeking/taking behavior.  Overdose reversal drug naloxone discussed    Drug screen reviewed      Requested Prescriptions     Signed Prescriptions Disp Refills    traMADoL (ULTRAM) 50 mg tablet 60 tablet 2     Sig: Take 1 tablet (50 mg total) by mouth every 12 (twelve) hours.         Plan:    Presumptive drug screen negative, this is the expected result, cup does not test for tramadol    He states tramadol is not helping control his discomfort    After lengthy discussion we discussed multiple  medications he states none of the medications Norco Tylenol 3 or tramadol help his medication he declines Lyrica and gabapentin    Declines referral to spine surgery    Declines any further procedures    He would like to continue with conservative management current medication    Continue home exercise program as directed    Tramadol 50 mg 1 p.o. q.12 hours      Follow-up 3 months sooner if needed    Dr. Zhou, August 2022    Bring original prescription medication bottles/container/box with labels to each visit

## 2023-09-13 NOTE — PATIENT INSTRUCTIONS
Thank you for choosing us for your care. I think an in-clinic visit would be best next steps based on your symptoms. Please schedule a clinic appointment; you won t be charged for this eVisit.      You can schedule an appointment right here in [facility], or call [phone]

## 2023-09-27 ENCOUNTER — OFFICE VISIT (OUTPATIENT)
Dept: FAMILY MEDICINE | Facility: OTHER | Age: 18
End: 2023-09-27
Attending: PHYSICIAN ASSISTANT
Payer: COMMERCIAL

## 2023-09-27 VITALS
WEIGHT: 116.2 LBS | SYSTOLIC BLOOD PRESSURE: 120 MMHG | HEIGHT: 58 IN | RESPIRATION RATE: 20 BRPM | BODY MASS INDEX: 24.39 KG/M2 | HEART RATE: 78 BPM | OXYGEN SATURATION: 98 % | TEMPERATURE: 98.4 F | DIASTOLIC BLOOD PRESSURE: 80 MMHG

## 2023-09-27 DIAGNOSIS — N94.10 PAIN IN FEMALE GENITALIA ON INTERCOURSE: ICD-10-CM

## 2023-09-27 DIAGNOSIS — N93.9 ABNORMAL VAGINAL BLEEDING: Primary | ICD-10-CM

## 2023-09-27 LAB
BACTERIAL VAGINOSIS VAG-IMP: NEGATIVE
C TRACH DNA SPEC QL PROBE+SIG AMP: NEGATIVE
CANDIDA DNA VAG QL NAA+PROBE: NOT DETECTED
CANDIDA GLABRATA / CANDIDA KRUSEI DNA: NOT DETECTED
N GONORRHOEA DNA SPEC QL NAA+PROBE: NEGATIVE
T VAGINALIS DNA VAG QL NAA+PROBE: NOT DETECTED

## 2023-09-27 PROCEDURE — 87491 CHLMYD TRACH DNA AMP PROBE: CPT | Mod: ZL | Performed by: PHYSICIAN ASSISTANT

## 2023-09-27 PROCEDURE — 99213 OFFICE O/P EST LOW 20 MIN: CPT | Performed by: PHYSICIAN ASSISTANT

## 2023-09-27 PROCEDURE — 87591 N.GONORRHOEAE DNA AMP PROB: CPT | Mod: ZL | Performed by: PHYSICIAN ASSISTANT

## 2023-09-27 PROCEDURE — 0352U MULTIPLEX VAGINAL PANEL BY PCR: CPT | Mod: ZL | Performed by: PHYSICIAN ASSISTANT

## 2023-09-27 ASSESSMENT — ENCOUNTER SYMPTOMS
DYSURIA: 0
NAUSEA: 0
MYALGIAS: 0
ABDOMINAL PAIN: 0
PALPITATIONS: 0
CHILLS: 0
FREQUENCY: 0
HEMATURIA: 0
HEMATOCHEZIA: 0
FLANK PAIN: 0
VOMITING: 0
COUGH: 0
WHEEZING: 0
DIZZINESS: 0
SHORTNESS OF BREATH: 0
FEVER: 0
LIGHT-HEADEDNESS: 0
DIARRHEA: 0
PSYCHIATRIC NEGATIVE: 1
CONSTIPATION: 0

## 2023-09-27 ASSESSMENT — PATIENT HEALTH QUESTIONNAIRE - PHQ9
SUM OF ALL RESPONSES TO PHQ QUESTIONS 1-9: 15
10. IF YOU CHECKED OFF ANY PROBLEMS, HOW DIFFICULT HAVE THESE PROBLEMS MADE IT FOR YOU TO DO YOUR WORK, TAKE CARE OF THINGS AT HOME, OR GET ALONG WITH OTHER PEOPLE: VERY DIFFICULT
SUM OF ALL RESPONSES TO PHQ QUESTIONS 1-9: 15

## 2023-09-27 ASSESSMENT — PAIN SCALES - GENERAL: PAINLEVEL: NO PAIN (0)

## 2023-09-27 ASSESSMENT — ASTHMA QUESTIONNAIRES: ACT_TOTALSCORE: 21

## 2023-09-27 NOTE — NURSING NOTE
"Chief Complaint   Patient presents with    Vaginal Problem     Discharge, bleeding after intercourse pain    Reports has had bacterial vaginosis 3 times in the last year.    Initial /80   Pulse 78   Temp 98.4  F (36.9  C) (Tympanic)   Resp 20   Ht 1.473 m (4' 10\")   Wt 52.7 kg (116 lb 3.2 oz)   LMP 09/04/2023   SpO2 98%   BMI 24.29 kg/m   Estimated body mass index is 24.29 kg/m  as calculated from the following:    Height as of this encounter: 1.473 m (4' 10\").    Weight as of this encounter: 52.7 kg (116 lb 3.2 oz).  Medication Reconciliation: complete    Uyen Frederick LPN    "

## 2023-09-27 NOTE — PROGRESS NOTES
Assessment & Plan   Problem List Items Addressed This Visit    None  Visit Diagnoses       Abnormal vaginal bleeding    -  Primary    Relevant Orders    Multiplex Vaginal Panel by PCR    GC/Chlamydia by PCR    Ob/Gyn Referral    US Pelvic Complete with Transvaginal    Pain in female genitalia on intercourse        Relevant Orders    Multiplex Vaginal Panel by PCR    GC/Chlamydia by PCR    Ob/Gyn Referral    US Pelvic Complete with Transvaginal           Abnormal vaginal bleeding and pelvic pain with intercourse: Completed gonorrhea, chlamydia, and vaginal wet prep to rule out infection concerns.  Referred for pelvic ultrasound and OB/GYN consult.  Labs are pending.  Gave warning signs and symptoms.  Encourage close follow-up if symptoms are changing or worsening.    Ordering of each unique test     Depression Screening Follow Up        9/27/2023    11:05 AM   PHQ   PHQ-9 Total Score 15   Q9: Thoughts of better off dead/self-harm past 2 weeks Several days   F/U: Thoughts of suicide or self-harm Yes   F/U: Self harm-plan No   F/U: Self-harm action No   F/U: Safety concerns No         9/27/2023    11:05 AM   Last PHQ-9   1.  Little interest or pleasure in doing things 1   2.  Feeling down, depressed, or hopeless 2   3.  Trouble falling or staying asleep, or sleeping too much 2   4.  Feeling tired or having little energy 2   5.  Poor appetite or overeating 2   6.  Feeling bad about yourself 1   7.  Trouble concentrating 3   8.  Moving slowly or restless 1   Q9: Thoughts of better off dead/self-harm past 2 weeks 1   PHQ-9 Total Score 15   In the past two weeks have you had thoughts of suicide or self harm? Yes   Do you have concerns about your personal safety or the safety of others? No   In the past 2 weeks have you thought about a plan or had intention to harm yourself? No   In the past 2 weeks have you acted on these thoughts in any way? No           Follow Up      Follow Up Actions Taken  Crisis resource  information provided in the After Visit Summary  Patient to follow up with PCP.  Clinic staff to schedule appointment if able.    Discussed the following ways the patient can remain in a safe environment:  be around others  See Patient Instructions    No follow-ups on file.    Sherrell Powell PA-C  [facility]    Michelle Tijerina is a 17 year old, presenting for the following health issues:  Vaginal Problem (Discharge, bleeding after intercourse pain )        9/27/2023    11:46 AM   Additional Questions   Roomed by Princess rehman LPN   Accompanied by self       History of Present Illness       Reason for visit:  Bleeding during/after intercourse and discomfort in lower abdomen after intercourse  Symptom onset:  3-4 weeks ago  Symptoms include:  Abnormal spotting in between periods and discomfort  Symptom intensity:  Moderate  Symptom progression:  Staying the same  Had these symptoms before:  Yes  Has tried/received treatment for these symptoms:  Yes  Previous treatment was successful:  No    She eats 0-1 servings of fruits and vegetables daily.She consumes 0 sweetened beverage(s) daily.She exercises with enough effort to increase her heart rate 30 to 60 minutes per day.  She exercises with enough effort to increase her heart rate 5 days per week.   She is taking medications regularly.     Patient has been having increased pelvic pain after intercourse over the last couple weeks.  I had intercourse a few weeks ago and then got excruciating pain.  1 week later she ended up getting an episode of gushing vaginal blood.  Discomfort in her lower abdomen currently after intercourse.  Spotting at times after intercourse.  Having mild amount of white vaginal discharge.  A little itching.  History of bacterial vaginosis, chlamydia, and yeast infections in the past.  Had Mirena IUD placed fall 2022.  No urinary symptoms, fevers, chills, nausea, vomiting, pregnancy concerns.  Declines pregnancy testing  "today.      Review of Systems   Constitutional:  Negative for chills and fever.   Respiratory:  Negative for cough, shortness of breath and wheezing.    Cardiovascular:  Negative for chest pain and palpitations.   Gastrointestinal:  Negative for abdominal pain, constipation, diarrhea, hematochezia, nausea and vomiting.   Genitourinary:  Positive for vaginal discharge. Negative for dysuria, flank pain, frequency, hematuria and urgency.   Musculoskeletal:  Negative for myalgias.   Skin:  Negative for rash.   Neurological:  Negative for dizziness and light-headedness.   Psychiatric/Behavioral: Negative.              Objective    /80   Pulse 78   Temp 98.4  F (36.9  C) (Tympanic)   Resp 20   Ht 1.473 m (4' 10\")   Wt 52.7 kg (116 lb 3.2 oz)   LMP 09/04/2023   SpO2 98%   BMI 24.29 kg/m    Body mass index is 24.29 kg/m .  Physical Exam  Vitals and nursing note reviewed.   Constitutional:       General: She is not in acute distress.     Appearance: Normal appearance. She is well-developed.   Cardiovascular:      Rate and Rhythm: Normal rate and regular rhythm.      Heart sounds: Normal heart sounds, S1 normal and S2 normal.   Pulmonary:      Effort: Pulmonary effort is normal. No respiratory distress.      Breath sounds: Normal breath sounds. No wheezing or rales.   Abdominal:      General: Abdomen is flat. Bowel sounds are normal.      Palpations: Abdomen is soft. There is no mass.      Tenderness: There is no abdominal tenderness. There is no right CVA tenderness, left CVA tenderness, guarding or rebound.   Genitourinary:     General: Normal vulva.      Labia:         Right: No rash or tenderness.         Left: No rash or tenderness.       Vagina: Vaginal discharge present.      Cervix: No cervical motion tenderness or discharge.      Uterus: Normal. Not tender.       Adnexa: Right adnexa normal and left adnexa normal.        Right: No mass or tenderness.          Left: No mass or tenderness.        " Comments: Small amount of white vaginal discharge appreciated.  Musculoskeletal:         General: Normal range of motion.      Cervical back: Normal range of motion.   Skin:     General: Skin is warm and dry.      Findings: No rash.   Neurological:      Mental Status: She is alert and oriented to person, place, and time.      Motor: No abnormal muscle tone.      Deep Tendon Reflexes: Reflexes are normal and symmetric.   Psychiatric:         Mood and Affect: Mood normal.         Behavior: Behavior normal.

## 2023-09-27 NOTE — PATIENT INSTRUCTIONS
Suicide Emergency First call for help:  [phone]  [phone]          Counselors:     [facility]: [phone]    Cathie Ferrara: [phone]    Cheryl Gianna: [phone]    Clinton Ferrara CNP, [facility]: [phone]    Andiechase Chawla: [phone]    [facility]: [phone]    Leonel Stubbs: [phone]    [phone]    [facility]: [phone]    Rhina Santos: [phone]    [facility]: [phone]    Adolescent Counseling:   [facility]: [phone]    [facility]: [phone]    [facility]: [phone]

## 2023-09-28 ENCOUNTER — HOSPITAL ENCOUNTER (OUTPATIENT)
Dept: ULTRASOUND IMAGING | Facility: OTHER | Age: 18
Discharge: HOME | End: 2023-09-28
Attending: PHYSICIAN ASSISTANT | Admitting: PHYSICIAN ASSISTANT
Payer: COMMERCIAL

## 2023-09-28 DIAGNOSIS — N93.9 ABNORMAL VAGINAL BLEEDING: ICD-10-CM

## 2023-09-28 DIAGNOSIS — N94.10 PAIN IN FEMALE GENITALIA ON INTERCOURSE: ICD-10-CM

## 2023-09-28 DIAGNOSIS — N84.0 ENDOMETRIAL POLYP: Primary | ICD-10-CM

## 2023-09-28 PROCEDURE — 76830 TRANSVAGINAL US NON-OB: CPT

## 2023-10-08 ENCOUNTER — HEALTH MAINTENANCE LETTER (OUTPATIENT)
Age: 18
End: 2023-10-08

## 2023-10-22 ASSESSMENT — PATIENT HEALTH QUESTIONNAIRE - PHQ9
SUM OF ALL RESPONSES TO PHQ QUESTIONS 1-9: 6
SUM OF ALL RESPONSES TO PHQ QUESTIONS 1-9: 6
10. IF YOU CHECKED OFF ANY PROBLEMS, HOW DIFFICULT HAVE THESE PROBLEMS MADE IT FOR YOU TO DO YOUR WORK, TAKE CARE OF THINGS AT HOME, OR GET ALONG WITH OTHER PEOPLE: SOMEWHAT DIFFICULT

## 2023-10-23 ENCOUNTER — OFFICE VISIT (OUTPATIENT)
Dept: OBGYN | Facility: OTHER | Age: 18
End: 2023-10-23
Attending: PHYSICIAN ASSISTANT
Payer: COMMERCIAL

## 2023-10-23 VITALS
BODY MASS INDEX: 24.79 KG/M2 | HEART RATE: 96 BPM | WEIGHT: 118.6 LBS | DIASTOLIC BLOOD PRESSURE: 68 MMHG | SYSTOLIC BLOOD PRESSURE: 110 MMHG

## 2023-10-23 DIAGNOSIS — N94.10 PAIN IN FEMALE GENITALIA ON INTERCOURSE: Primary | ICD-10-CM

## 2023-10-23 DIAGNOSIS — N93.9 ABNORMAL VAGINAL BLEEDING: ICD-10-CM

## 2023-10-23 LAB
BACTERIAL VAGINOSIS VAG-IMP: NEGATIVE
CANDIDA DNA VAG QL NAA+PROBE: NOT DETECTED
CANDIDA GLABRATA / CANDIDA KRUSEI DNA: NOT DETECTED
T VAGINALIS DNA VAG QL NAA+PROBE: NOT DETECTED

## 2023-10-23 PROCEDURE — 99214 OFFICE O/P EST MOD 30 MIN: CPT | Performed by: STUDENT IN AN ORGANIZED HEALTH CARE EDUCATION/TRAINING PROGRAM

## 2023-10-23 PROCEDURE — 0352U MULTIPLEX VAGINAL PANEL BY PCR: CPT | Mod: ZL | Performed by: STUDENT IN AN ORGANIZED HEALTH CARE EDUCATION/TRAINING PROGRAM

## 2023-10-23 NOTE — NURSING NOTE
Pt presents to clinic today for abnormal uterine bleeding for a few months.      Medication Reconciliation: complete  Shae Martin LPN

## 2023-10-23 NOTE — PROGRESS NOTES
GYN visit    S: Ms. Lilliana Viera is a 17 year old  here for discussion of abnormal uterine bleeding and pain with intercourse. The pain is located across her entire lower abdomen after intercourse and feels like a cramping sensation. She denies any significant unilateral pain, no pain with insertion. The bleeding is more unpredictable with the IUD and she seems to note it after intercourse sometimes.     She had a pelvic US which showed normal uterine anatomy in size, a possible 8 mm polyp on a stalk noted in the endometrial cavity. She has a follow up US scheduled for the end of November.       O:  /68   Pulse 96   Wt 53.8 kg (118 lb 9.6 oz)   LMP 10/04/2023   BMI 24.79 kg/m      Gen: Well-appearing, NAD  Pulm: nonlabored  Abd: Soft, non-tender, non-distended  Ext: No LE edema, extremities warm and well perfused    Pelvic:  Normal appearing external female genitalia. Normal hair distribution. Vagina is without lesions. White, thick vaginal discharge. Cervix nulliparous, no lesions, no cervical motion tenderness. IUD strings noted at the external os. Uterus is small, mobile, non-tender. No adnexal tenderness or masses. No pain on palpation of the pelvic floor musculature.    A/P:  Ms. Lilliana Viera is a 17 year old  here for abnormal uterine bleeding, pain with intercourse    # AUB  -- follow up US scheduled for endometrial polyp rule out: if still present, will proceed with hystereoscopy polypectomy    # Pain with intercourse  -- Infectious workup pending  -- Anticipate this may be related to cramping and some of the bleeding caused by possible IUD movement during intercourse. Discussed we could try to switch out Mirena or Kyleena to see if the smaller size would make a difference. Also offered pelvic PT if desired.    Answers submitted by the patient for this visit:  Patient Health Questionnaire (Submitted on 10/22/2023)  If you checked off any problems, how difficult have these problems  made it for you to do your work, take care of things at home, or get along with other people?: Somewhat difficult  PHQ9 TOTAL SCORE: 6    Total amount of time spent during today's encounter including chart prep, face to face, exam and documentation was 30 minutes  Clari Ruiz MD on 10/23/2023 at 3:18 PM

## 2023-11-28 ENCOUNTER — HOSPITAL ENCOUNTER (OUTPATIENT)
Dept: ULTRASOUND IMAGING | Facility: OTHER | Age: 18
Discharge: HOME | End: 2023-11-28
Attending: PHYSICIAN ASSISTANT | Admitting: PHYSICIAN ASSISTANT
Payer: COMMERCIAL

## 2023-11-28 DIAGNOSIS — N93.9 ABNORMAL VAGINAL BLEEDING: ICD-10-CM

## 2023-11-28 DIAGNOSIS — N84.0 ENDOMETRIAL POLYP: ICD-10-CM

## 2023-11-28 PROCEDURE — 76856 US EXAM PELVIC COMPLETE: CPT

## 2023-12-01 ENCOUNTER — E-VISIT (OUTPATIENT)
Dept: FAMILY MEDICINE | Facility: OTHER | Age: 18
End: 2023-12-01
Payer: COMMERCIAL

## 2023-12-01 ENCOUNTER — NURSE TRIAGE (OUTPATIENT)
Dept: FAMILY MEDICINE | Facility: OTHER | Age: 18
End: 2023-12-01

## 2023-12-01 DIAGNOSIS — F33.2 MAJOR DEPRESSIVE DISORDER, RECURRENT SEVERE WITHOUT PSYCHOTIC FEATURES (H): Primary | ICD-10-CM

## 2023-12-01 ASSESSMENT — ANXIETY QUESTIONNAIRES
3. WORRYING TOO MUCH ABOUT DIFFERENT THINGS: NEARLY EVERY DAY
GAD7 TOTAL SCORE: 12
GAD7 TOTAL SCORE: 12
8. IF YOU CHECKED OFF ANY PROBLEMS, HOW DIFFICULT HAVE THESE MADE IT FOR YOU TO DO YOUR WORK, TAKE CARE OF THINGS AT HOME, OR GET ALONG WITH OTHER PEOPLE?: SOMEWHAT DIFFICULT
7. FEELING AFRAID AS IF SOMETHING AWFUL MIGHT HAPPEN: NOT AT ALL
2. NOT BEING ABLE TO STOP OR CONTROL WORRYING: NEARLY EVERY DAY
GAD7 TOTAL SCORE: 12
6. BECOMING EASILY ANNOYED OR IRRITABLE: MORE THAN HALF THE DAYS
7. FEELING AFRAID AS IF SOMETHING AWFUL MIGHT HAPPEN: NOT AT ALL
1. FEELING NERVOUS, ANXIOUS, OR ON EDGE: MORE THAN HALF THE DAYS
4. TROUBLE RELAXING: MORE THAN HALF THE DAYS
5. BEING SO RESTLESS THAT IT IS HARD TO SIT STILL: NOT AT ALL

## 2023-12-01 ASSESSMENT — PATIENT HEALTH QUESTIONNAIRE - PHQ9
SUM OF ALL RESPONSES TO PHQ QUESTIONS 1-9: 8
SUM OF ALL RESPONSES TO PHQ QUESTIONS 1-9: 8
10. IF YOU CHECKED OFF ANY PROBLEMS, HOW DIFFICULT HAVE THESE PROBLEMS MADE IT FOR YOU TO DO YOUR WORK, TAKE CARE OF THINGS AT HOME, OR GET ALONG WITH OTHER PEOPLE: SOMEWHAT DIFFICULT

## 2023-12-01 NOTE — TELEPHONE ENCOUNTER
Patient would also like to speak about having her animal listed as an emotional support pet for anxiety and depression. Will route to another provider for e-visit and further recommendations.    Additional Information   Negative: Patient attempted suicide   Negative: Patient is threatening suicide now   Negative: Violent behavior, or threatening to physically hurt or kill someone   Negative: Patient is very confused (disoriented, slurred speech) and no other adult (e.g., friend or family member) available   Negative: Difficult to awaken or acting very confused (disoriented, slurred speech) and new-onset   Negative: Sounds like a life-threatening emergency to the triager   Negative: Suicide thoughts, threats, attempts, or questions   Negative: Questions or concerns about alcohol use, unhealthy alcohol use, binge drinking, intoxication, or withdrawal   Negative: Questions or concerns about substance use (drug use), unhealthy drug use, intoxication, or withdrawal   Negative: Anxiety is main problem or symptom   Negative: Depression and unable to do any of normal activities (e.g., self care, school, work; in comparison to baseline).   Negative: Very strange or confused behavior   Negative: Patient sounds very sick or weak to the triager   Negative: Fever > 101 F  (38.3 C)   Negative: Sometimes has thoughts of suicide   Negative: Symptoms interfere with work or school   Negative: Depression is worsening (e.g.,sleeping poorly, less able to do activities of daily living)   Negative: Significant weight loss (> 10 pounds or 5 kg) and not dieting   Negative: Substance use (drug use) or misuse, known or suspected   Negative: Unhealthy alcohol use, known or suspected   Negative: New or changed psychiatric medications > 2 weeks ago and not feeling any better   Negative: Requesting to talk with a counselor (mental health worker, psychiatrist, etc.)   Negative: Patient wants to be seen   Negative: Started on anti-depressant  medications < 2 weeks ago and is not feeling any better   Negative: Feels depressed only on days just before menstrual period   Negative: History of manic-depression (bipolar disorder)   Negative: Pregnant    Protocols used: Depression-A-[place]

## 2023-12-02 ASSESSMENT — ANXIETY QUESTIONNAIRES: GAD7 TOTAL SCORE: 12

## 2023-12-02 ASSESSMENT — PATIENT HEALTH QUESTIONNAIRE - PHQ9: SUM OF ALL RESPONSES TO PHQ QUESTIONS 1-9: 8

## 2023-12-04 ENCOUNTER — TELEPHONE (OUTPATIENT)
Dept: OBGYN | Facility: OTHER | Age: 18
End: 2023-12-04
Payer: COMMERCIAL

## 2023-12-04 NOTE — TELEPHONE ENCOUNTER
Reason for call: Request for results.    Name of test or procedure: 11/28/23    Date of test or procedure: Pelvic US    Location of test or procedure: [facility]    Preferred method for responding to this message: Telephone Call    Phone number patient can be reached at: Cell number on file:    Telephone Information:   Mobile [phone]       If we can't reach you directly, may we leave a detailed response at the number you provided?Yes    Jacquelyn Sin on 12/4/2023 at 11:59 AM

## 2023-12-06 ENCOUNTER — OFFICE VISIT (OUTPATIENT)
Dept: FAMILY MEDICINE | Facility: OTHER | Age: 18
End: 2023-12-06
Attending: NURSE PRACTITIONER
Payer: COMMERCIAL

## 2023-12-06 VITALS
HEIGHT: 59 IN | WEIGHT: 120 LBS | TEMPERATURE: 99.2 F | SYSTOLIC BLOOD PRESSURE: 108 MMHG | DIASTOLIC BLOOD PRESSURE: 64 MMHG | HEART RATE: 83 BPM | RESPIRATION RATE: 16 BRPM | OXYGEN SATURATION: 97 % | BODY MASS INDEX: 24.19 KG/M2

## 2023-12-06 DIAGNOSIS — R39.9 UTI SYMPTOMS: ICD-10-CM

## 2023-12-06 DIAGNOSIS — N89.8 VAGINAL ITCHING: ICD-10-CM

## 2023-12-06 DIAGNOSIS — N39.0 ACUTE UTI (URINARY TRACT INFECTION): Primary | ICD-10-CM

## 2023-12-06 LAB
ALBUMIN UR-MCNC: 10 MG/DL
APPEARANCE UR: CLEAR
BACTERIA #/AREA URNS HPF: ABNORMAL /HPF
BACTERIAL VAGINOSIS VAG-IMP: NEGATIVE
BILIRUB UR QL STRIP: NEGATIVE
CANDIDA DNA VAG QL NAA+PROBE: NOT DETECTED
CANDIDA GLABRATA / CANDIDA KRUSEI DNA: NOT DETECTED
COLOR UR AUTO: ABNORMAL
GLUCOSE UR STRIP-MCNC: NEGATIVE MG/DL
HGB UR QL STRIP: NEGATIVE
HYALINE CASTS: 1 /LPF
KETONES UR STRIP-MCNC: NEGATIVE MG/DL
LEUKOCYTE ESTERASE UR QL STRIP: ABNORMAL
MUCOUS THREADS #/AREA URNS LPF: PRESENT /LPF
NITRATE UR QL: NEGATIVE
PH UR STRIP: 6 [PH] (ref 5–9)
RBC URINE: 1 /HPF
SP GR UR STRIP: 1.03 (ref 1–1.03)
SQUAMOUS EPITHELIAL: 3 /HPF
T VAGINALIS DNA VAG QL NAA+PROBE: NOT DETECTED
UROBILINOGEN UR STRIP-MCNC: NORMAL MG/DL
WBC URINE: 16 /HPF

## 2023-12-06 PROCEDURE — 99214 OFFICE O/P EST MOD 30 MIN: CPT

## 2023-12-06 PROCEDURE — 87086 URINE CULTURE/COLONY COUNT: CPT | Mod: ZL

## 2023-12-06 PROCEDURE — 81001 URINALYSIS AUTO W/SCOPE: CPT | Mod: ZL

## 2023-12-06 PROCEDURE — 0352U MULTIPLEX VAGINAL PANEL BY PCR: CPT | Mod: ZL

## 2023-12-06 RX ORDER — SULFAMETHOXAZOLE/TRIMETHOPRIM 800-160 MG
1 TABLET ORAL 2 TIMES DAILY
Qty: 6 TABLET | Refills: 0 | Status: SHIPPED | OUTPATIENT
Start: 2023-12-06 | End: 2023-12-09

## 2023-12-06 ASSESSMENT — PATIENT HEALTH QUESTIONNAIRE - PHQ9
10. IF YOU CHECKED OFF ANY PROBLEMS, HOW DIFFICULT HAVE THESE PROBLEMS MADE IT FOR YOU TO DO YOUR WORK, TAKE CARE OF THINGS AT HOME, OR GET ALONG WITH OTHER PEOPLE: SOMEWHAT DIFFICULT
SUM OF ALL RESPONSES TO PHQ QUESTIONS 1-9: 12
SUM OF ALL RESPONSES TO PHQ QUESTIONS 1-9: 12

## 2023-12-06 ASSESSMENT — PAIN SCALES - GENERAL: PAINLEVEL: MODERATE PAIN (5)

## 2023-12-06 NOTE — PROGRESS NOTES
ASSESSMENT/PLAN:    I have reviewed the nursing notes.  I have reviewed the findings, diagnosis, plan and need for follow up with the patient.    1. Acute UTI (urinary tract infection)  2. UTI symptoms  3. Vaginal itching  - Multiplex Vaginal Panel by PCR  - UA Macroscopic with reflex to Microscopic and Culture  - Urine Culture  - sulfamethoxazole-trimethoprim (BACTRIM DS) 800-160 MG tablet; Take 1 tablet by mouth 2 times daily for 3 days  Dispense: 6 tablet; Refill: 0    Patient presents with urinary symptoms.  Patient's vitals are stable and she appears nontoxic.  Urinalysis was positive for a small amount of leukocyte esterase.  Will treat for a UTI with Bactrim.  Urine culture is pending and patient will be notified of the results, antibiotics will be changed if needed based off of culture and sensitivity.  Multiplex vaginal panel was negative.  Advised patient to push fluids and get rest.  May take Tylenol and ibuprofen as needed for urinary tract discomfort.    Discussed warning signs/symptoms indicative of need to f/u    Follow up if symptoms persist or worsen or concerns    I explained my diagnostic considerations and recommendations to the patient, who voiced understanding and agreement with the treatment plan. All questions were answered. We discussed potential side effects of any prescribed or recommended therapies, as well as expectations for response to treatments.    AUNG Rothman CNP  12/6/2023  3:14 PM    HPI:    Lilliana Viera is a 18 year old female  who presents to [facility] today for concerns of urinary symptoms    Patient presents with concerns of possible UTI, x 2 weeks    Symptoms: dysuria, urgency, burning, suprapubic pain and pressure, and back pain  Flank Pain or Back Pain: Yes  Blood in Urine: Yes  Last Urination: in clinic  Able to Completely Urinate/Void: Yes  Prior UTIs: Yes  Exposures to STIs/STDs: Yes: has had chlamydia in the past, no recent exposure  Fevers, chills,  N/V/D: No  Change in Bowel Habits: No  Vaginal Symptoms or Discharge: Yes: vaginal itching and discharge  Recent swimming/use of hot tubs/swimming pools/lakes: No    LMP: 12/1/23    Treatments tried: Azo    Denies CP, SOB, calf tenderness. No new medications. Denies exposure to ill or COVID positive patients.     Allergies: NKA    PCP: [facility]    Past Medical History:   Diagnosis Date    Closed fracture of phalanx of finger     2015    Migraine without status migrainosus, not intractable     No Comments Provided     Past Surgical History:   Procedure Laterality Date    ARTHROSCOPIC RECONSTRUCTION ANTERIOR CRUCIATE LIGAMENT BONE TENDON BONE AUTOGRAFT Left 2/15/2019    Procedure: Examination Under Anesthesia Left Knee, Left Anterior Cruciate Ligament Reconstruction, Bone Tendon Bone Autograft;  Surgeon: Himanshu Cortez MD;  Location: [facility] OR    OTHER SURGICAL HISTORY      [id number],NO PREVIOUS SURGERY     Social History     Tobacco Use    Smoking status: Never    Smokeless tobacco: Never   Substance Use Topics    Alcohol use: No     Alcohol/week: 0.0 standard drinks of alcohol     Current Outpatient Medications   Medication Sig Dispense Refill    albuterol (PROAIR HFA/PROVENTIL HFA/VENTOLIN HFA) 108 (90 Base) MCG/ACT inhaler Inhale 2 puffs into the lungs every 4 hours as needed for shortness of breath / dyspnea or wheezing 18 g 1    escitalopram (LEXAPRO) 20 MG tablet Take 1 tablet (20 mg) by mouth daily 30 tablet 11    naproxen (NAPROSYN) 500 MG tablet Take 1 tablet (500 mg) by mouth 2 times daily (with meals) 60 tablet 1    traZODone (DESYREL) 50 MG tablet Take 1 tablet (50 mg) by mouth At Bedtime 30 tablet 1    ALPRAZolam (XANAX) 0.25 MG tablet Take 1 tablet (0.25 mg) by mouth 3 times daily as needed for anxiety 30 tablet 1    diclofenac (VOLTAREN) 1 % topical gel Apply 4 g topically 4 times daily as needed for moderate pain 100 g 11     No Known Allergies  Past medical history, past surgical history,  "current medications and allergies reviewed and accurate to the best of my knowledge.      ROS:  Refer to HPI    /64 (BP Location: Right arm, Patient Position: Sitting, Cuff Size: Adult Regular)   Pulse 83   Temp 99.2  F (37.3  C) (Tympanic)   Resp 16   Ht 1.499 m (4' 11\")   Wt 54.4 kg (120 lb)   LMP 10/04/2023   SpO2 97%   BMI 24.24 kg/m      EXAM:  General Appearance: Well appearing 18 year old female, appropriate appearance for age. No acute distress   Respiratory: normal chest wall and respirations.  Normal effort.  Clear to auscultation bilaterally, no wheezing, crackles or rhonchi.  No increased work of breathing.  No cough appreciated.  Cardiac: RRR with no murmurs  :  No suprapubic tenderness to palpation.  Absent CVA tenderness to palpation.    Musculoskeletal:  Equal movement of bilateral upper extremities.  Equal movement of bilateral lower extremities.  Normal gait.    Dermatological: no rashes noted of exposed skin  Neuro: Alert and oriented to person, place, and time.    Psychological: normal affect, alert, oriented, and pleasant.     Labs:  Results for orders placed or performed in visit on 12/06/23   UA Macroscopic with reflex to Microscopic and Culture     Status: Abnormal    Specimen: Urine, Clean Catch   Result Value Ref Range    Color Urine Light Yellow Colorless, Straw, Light Yellow, Yellow    Appearance Urine Clear Clear    Glucose Urine Negative Negative mg/dL    Bilirubin Urine Negative Negative    Ketones Urine Negative Negative mg/dL    Specific Gravity Urine 1.031 (H) 1.000 - 1.030    Blood Urine Negative Negative    pH Urine 6.0 5.0 - 9.0    Protein Albumin Urine 10 (A) Negative mg/dL    Urobilinogen Urine Normal Normal, 2.0 mg/dL    Nitrite Urine Negative Negative    Leukocyte Esterase Urine Small (A) Negative    Bacteria Urine Few (A) None Seen /HPF    Mucus Urine Present (A) None Seen /LPF    RBC Urine 1 <=2 /HPF    WBC Urine 16 (H) <=5 /HPF    Squamous Epithelials Urine " 3 (H) <=1 /HPF    Hyaline Casts Urine 1 <=2 /LPF    Narrative    Urine Culture ordered based on laboratory criteria   Multiplex Vaginal Panel by PCR     Status: Normal    Specimen: Vagina; Swab   Result Value Ref Range    Bacterial Vaginosis Organism DNA Negative Negative    Candida Group DNA Not Detected Not Detected    Candida glabrata / Alecia krusei DNA Not Detected Not Detected    Trichomonas vaginalis DNA Not Detected Not Detected    Narrative    The Xpert  Xpress MVP test, performed on the [employer], is an automated, qualitative in vitro diagnostic test for the detection of DNA targets from anaerobic bacteria associated with bacterial vaginosis, Candida species associated with vulvovaginal candidiasis, and Trichomonas vaginalis. The assay uses clinician-collected and self-collected vaginal swabs from patients who are symptomatic for vaginitis/ vaginosis. The Xpert  Xpress MVP test utilizes real-time polymerase chain reaction (PCR) for the amplification of specific DNA targets and utilizes fluorogenic target-specific hybridization probes to detect and differentiate DNA. It is intended to aid in the diagnosis of vaginal infections in women with a clinical presentation consistent with bacterial vaginosis, vulvovaginal candidiasis, or trichomoniasis.   The assay targets three anaerobic microorgansims that are associated with bacterial vaginosis (BV). Other organisms that are not detected by the Xpert  Xpress MVP test have also been reported to be associated with BV. The BV organism and Candida species targets of the Xpert  Xpress MVP test can be commensal in women; positive results must be considered in conjunction with other clinical and patient information to determine the disease status.

## 2023-12-06 NOTE — NURSING NOTE
"Chief Complaint   Patient presents with    Urinary Problem     Burning with urination x 2 weeks     Patient tx with AZO with some relief. Sx persist.  Otc urinary test was positive for infection.    Initial /64 (BP Location: Right arm, Patient Position: Sitting, Cuff Size: Adult Regular)   Pulse 83   Temp 99.2  F (37.3  C) (Tympanic)   Resp 16   Ht 1.499 m (4' 11\")   Wt 54.4 kg (120 lb)   LMP 10/04/2023   SpO2 97%   BMI 24.24 kg/m   Estimated body mass index is 24.24 kg/m  as calculated from the following:    Height as of this encounter: 1.499 m (4' 11\").    Weight as of this encounter: 54.4 kg (120 lb).     Advance Care Directive on file? no    FOOD SECURITY SCREENING QUESTIONS:    The next two questions are to help us understand your food security.  If you are feeling you need any assistance in this area, we have resources available to support you today.    Hunger Vital Signs:  Within the past 12 months we worried whether our food would run out before we got money to buy more. Never  Within the past 12 months the food we bought just didn't last and we didn't have money to get more. Never  Kierra Whalen LPN,LPN on 12/6/2023 at 3:10 PM      Kierra Whalen LPN     "

## 2023-12-07 NOTE — TELEPHONE ENCOUNTER
Pt is calling and wanting to schedule hysteroscopy. Please place case request     Lissette PICKERING RN .......December 7, 2023 2:57 PM  Registered Nurse for [facility] OB/Gyn providers

## 2023-12-08 LAB — BACTERIA UR CULT: ABNORMAL

## 2023-12-11 ENCOUNTER — TELEPHONE (OUTPATIENT)
Dept: OBGYN | Facility: OTHER | Age: 18
End: 2023-12-11
Payer: COMMERCIAL

## 2023-12-11 DIAGNOSIS — N84.0 ENDOMETRIAL POLYP: Primary | ICD-10-CM

## 2023-12-11 NOTE — TELEPHONE ENCOUNTER
"Date of Surgery: 01/10/2024  Type of Surgery: HYSTEROSCOPY, DIAGNOSTIC, WITH DILATION AND CURETTAGE OF UTERUS (N/A)   Surgeon: Dr. Shefali Ruiz     Over the phone the the below was gone through.     Patient was given surgical folder  which includes pre-operative bathing instructions related to the two packets of Hibiclens surgical prep provided. appropriate appointments were scheduled by the [facility] .. Surgical forms were copied and kept for informative purposes. Originals were delivered to Day-surgery. Questions were answered to the best of this nurse's ability.        STOP BANG    Fever/Chills or other infectious symptoms in past month? no  >10 pound weight loss in the past 2 months? no  Health Care Directive on file? no  History of blood transfusions? no  Td up to date? Yes   History of VRE/MRSA? No      Obstructive Sleep Apnea screening    Preoperative Evaluation: Obstructive Sleep Apnea screening    S: Snore -  Do you snore loudly? (louder than talking or loud enough to be heard through closed doors) No  T: Tired - Do you often feel tired, fatigued, or sleepy during the daytime?Yes  O: Observed - Has anyone ever observed you stop breathing during your sleep?No  P: Pressure - Do you have or are you being treated for high blood pressure?No  B: BMI - BMI greater than 35kg/m2?No  A: Age - Age over 50 years old?No  N: Neck - Neck circumference greater than 40 cm?No  G: Gender - Gender: Male?No    Total number of \"YES\" responses:  1    Scoring: Low risk of NERY 0-2  At Risk of NERY: >3 High Risk of NERY: 5-8      Total yes answers in NERY section:    Low risk 0-2  At risk 3-4  High risk 5-8    Ellen Danielle RN............. 12/11/2023 1:48 PM    "

## 2023-12-11 NOTE — TELEPHONE ENCOUNTER
Called and spoke to Patient after verifying last name and date of birth. Patient stated that she would like to schedule surgery. Went through surgical book and appointments with patient. She verbalized understanding and had no questions.     See other encounter.     Ellen Danielle RN on 12/11/2023 at 4:12 PM

## 2023-12-17 ASSESSMENT — PATIENT HEALTH QUESTIONNAIRE - PHQ9
SUM OF ALL RESPONSES TO PHQ QUESTIONS 1-9: 4
SUM OF ALL RESPONSES TO PHQ QUESTIONS 1-9: 4
10. IF YOU CHECKED OFF ANY PROBLEMS, HOW DIFFICULT HAVE THESE PROBLEMS MADE IT FOR YOU TO DO YOUR WORK, TAKE CARE OF THINGS AT HOME, OR GET ALONG WITH OTHER PEOPLE: SOMEWHAT DIFFICULT

## 2023-12-18 ENCOUNTER — HOSPITAL ENCOUNTER (OUTPATIENT)
Dept: CT IMAGING | Facility: OTHER | Age: 18
Discharge: HOME | End: 2023-12-18
Attending: FAMILY MEDICINE
Payer: COMMERCIAL

## 2023-12-18 ENCOUNTER — OFFICE VISIT (OUTPATIENT)
Dept: FAMILY MEDICINE | Facility: OTHER | Age: 18
End: 2023-12-18
Attending: FAMILY MEDICINE
Payer: COMMERCIAL

## 2023-12-18 VITALS
HEART RATE: 76 BPM | BODY MASS INDEX: 24.19 KG/M2 | TEMPERATURE: 97.6 F | HEIGHT: 59 IN | SYSTOLIC BLOOD PRESSURE: 96 MMHG | OXYGEN SATURATION: 97 % | DIASTOLIC BLOOD PRESSURE: 68 MMHG | RESPIRATION RATE: 16 BRPM | WEIGHT: 120 LBS

## 2023-12-18 DIAGNOSIS — N84.0 ENDOMETRIAL POLYP: ICD-10-CM

## 2023-12-18 DIAGNOSIS — Z87.442 HISTORY OF KIDNEY STONES: ICD-10-CM

## 2023-12-18 DIAGNOSIS — Z01.818 PREOP GENERAL PHYSICAL EXAM: Primary | ICD-10-CM

## 2023-12-18 DIAGNOSIS — F33.2 MAJOR DEPRESSIVE DISORDER, RECURRENT SEVERE WITHOUT PSYCHOTIC FEATURES (H): ICD-10-CM

## 2023-12-18 DIAGNOSIS — R30.0 DYSURIA: ICD-10-CM

## 2023-12-18 DIAGNOSIS — R10.9 LEFT FLANK PAIN: ICD-10-CM

## 2023-12-18 DIAGNOSIS — N93.9 ABNORMAL VAGINAL BLEEDING: ICD-10-CM

## 2023-12-18 DIAGNOSIS — N94.10 PAIN IN FEMALE GENITALIA ON INTERCOURSE: ICD-10-CM

## 2023-12-18 LAB
ALBUMIN UR-MCNC: NEGATIVE MG/DL
APPEARANCE UR: ABNORMAL
BACTERIA #/AREA URNS HPF: ABNORMAL /HPF
BASOPHILS # BLD AUTO: 0.1 10E3/UL (ref 0–0.2)
BASOPHILS NFR BLD AUTO: 1 %
BILIRUB UR QL STRIP: NEGATIVE
COLOR UR AUTO: ABNORMAL
EOSINOPHIL # BLD AUTO: 0.2 10E3/UL (ref 0–0.7)
EOSINOPHIL NFR BLD AUTO: 2 %
ERYTHROCYTE [DISTWIDTH] IN BLOOD BY AUTOMATED COUNT: 12.2 % (ref 10–15)
GLUCOSE UR STRIP-MCNC: NEGATIVE MG/DL
HCG UR QL: NEGATIVE
HCT VFR BLD AUTO: 43.8 % (ref 35–47)
HGB BLD-MCNC: 15.3 G/DL (ref 11.7–15.7)
HGB UR QL STRIP: NEGATIVE
HOLD SPECIMEN: NORMAL
IMM GRANULOCYTES # BLD: 0 10E3/UL
IMM GRANULOCYTES NFR BLD: 0 %
KETONES UR STRIP-MCNC: NEGATIVE MG/DL
LEUKOCYTE ESTERASE UR QL STRIP: ABNORMAL
LYMPHOCYTES # BLD AUTO: 2 10E3/UL (ref 0.8–5.3)
LYMPHOCYTES NFR BLD AUTO: 21 %
MCH RBC QN AUTO: 31.5 PG (ref 26.5–33)
MCHC RBC AUTO-ENTMCNC: 34.9 G/DL (ref 31.5–36.5)
MCV RBC AUTO: 90 FL (ref 78–100)
MONOCYTES # BLD AUTO: 0.7 10E3/UL (ref 0–1.3)
MONOCYTES NFR BLD AUTO: 7 %
MUCOUS THREADS #/AREA URNS LPF: PRESENT /LPF
NEUTROPHILS # BLD AUTO: 6.4 10E3/UL (ref 1.6–8.3)
NEUTROPHILS NFR BLD AUTO: 69 %
NITRATE UR QL: NEGATIVE
NRBC # BLD AUTO: 0 10E3/UL
NRBC BLD AUTO-RTO: 0 /100
PH UR STRIP: 5.5 [PH] (ref 5–9)
PLATELET # BLD AUTO: 327 10E3/UL (ref 150–450)
RBC # BLD AUTO: 4.85 10E6/UL (ref 3.8–5.2)
RBC URINE: 5 /HPF
SP GR UR STRIP: 1.02 (ref 1–1.03)
SQUAMOUS EPITHELIAL: 3 /HPF
UROBILINOGEN UR STRIP-MCNC: NORMAL MG/DL
WBC # BLD AUTO: 9.3 10E3/UL (ref 4–11)
WBC URINE: 97 /HPF

## 2023-12-18 PROCEDURE — 81001 URINALYSIS AUTO W/SCOPE: CPT | Mod: ZL | Performed by: FAMILY MEDICINE

## 2023-12-18 PROCEDURE — 74176 CT ABD & PELVIS W/O CONTRAST: CPT

## 2023-12-18 PROCEDURE — 81025 URINE PREGNANCY TEST: CPT | Mod: ZL | Performed by: FAMILY MEDICINE

## 2023-12-18 PROCEDURE — 85025 COMPLETE CBC W/AUTO DIFF WBC: CPT | Mod: ZL | Performed by: FAMILY MEDICINE

## 2023-12-18 PROCEDURE — 87086 URINE CULTURE/COLONY COUNT: CPT | Mod: ZL | Performed by: FAMILY MEDICINE

## 2023-12-18 PROCEDURE — 36415 COLL VENOUS BLD VENIPUNCTURE: CPT | Mod: ZL | Performed by: FAMILY MEDICINE

## 2023-12-18 PROCEDURE — 99214 OFFICE O/P EST MOD 30 MIN: CPT | Performed by: FAMILY MEDICINE

## 2023-12-18 ASSESSMENT — ANXIETY QUESTIONNAIRES
GAD7 TOTAL SCORE: 8
GAD7 TOTAL SCORE: 8
7. FEELING AFRAID AS IF SOMETHING AWFUL MIGHT HAPPEN: NOT AT ALL
2. NOT BEING ABLE TO STOP OR CONTROL WORRYING: MORE THAN HALF THE DAYS
5. BEING SO RESTLESS THAT IT IS HARD TO SIT STILL: NOT AT ALL
6. BECOMING EASILY ANNOYED OR IRRITABLE: SEVERAL DAYS
3. WORRYING TOO MUCH ABOUT DIFFERENT THINGS: MORE THAN HALF THE DAYS
1. FEELING NERVOUS, ANXIOUS, OR ON EDGE: MORE THAN HALF THE DAYS

## 2023-12-18 ASSESSMENT — PATIENT HEALTH QUESTIONNAIRE - PHQ9: 5. POOR APPETITE OR OVEREATING: SEVERAL DAYS

## 2023-12-18 ASSESSMENT — PAIN SCALES - GENERAL: PAINLEVEL: SEVERE PAIN (7)

## 2023-12-18 NOTE — PROGRESS NOTES
[facility]  [address]  [place] [address]  Phone: [phone]  Fax: [phone]  Primary Provider: Jj Toure  Pre-op Performing Provider: JJ TOURE      PREOPERATIVE EVALUATION:  Today's date: 12/18/2023    Lilliana is a 18 year old, presenting for the following:  Pre-Op Exam (Surgery date 1/10/24)        12/18/2023     9:33 AM   Additional Questions   Roomed by Amy White LPN       Surgical Information:  Surgery/Procedure: Hysteroscopy  Surgery Location: [facility]  Surgeon: Dr Shefali Tyson  Surgery Date: 01/10/24  Time of Surgery: TBD  Where patient plans to recover: At home with family  Fax number for surgical facility: Note does not need to be faxed, will be available electronically in Epic.    Diagnoses and associated orders for this visit:  Preop general physical exam  -     CBC with Platelets & Differential; Future  -     UA reflex to Microscopic; Future  -     Pregnancy, Urine (HCG); Future  -     CBC with Platelets & Differential  -     Pregnancy, Urine (HCG)    Endometrial polyp  -     CBC with Platelets & Differential; Future  -     CBC with Platelets & Differential    Abnormal vaginal bleeding  -     CBC with Platelets & Differential; Future  -     Pregnancy, Urine (HCG); Future  -     CBC with Platelets & Differential  -     Pregnancy, Urine (HCG)    Pain in female genitalia on intercourse    Major depressive disorder, recurrent severe without psychotic features (H)    Dysuria  -     UA Macroscopic with reflex to Microscopic and Culture; Future  -     UA Macroscopic with reflex to Microscopic and Culture  -     Urine Culture  -     amoxicillin-clavulanate (AUGMENTIN) 875-125 MG tablet; Take 1 tablet by mouth 2 times daily for 7 days    Left flank pain  -     CT Abdomen Pelvis w/o Contrast; Future    History of kidney stones  -     CT Abdomen Pelvis w/o Contrast; Future    Other orders  -     Extra Tube; Future  -     Extra Tube      Okay to go  ahead with planned procedure.  Question UTI pending culture we will treat with Augmentin.  No evidence of ureteral stone.    Domenic Toure MD     Subjective       HPI related to upcoming procedure: Preoperative risk assessment consultation was requested on Lilliana Viera by Clari Tyson MD prior to hysteroscopy and D&C with IUD insertion.   This is scheduled for 1024. I am asked to see this patient for preoperative clearance prior to this procedure.     Patient has had uterine bleeding and cramping after intercourse and on pelvic ultrasound was noted to have an 8 mm polyp on a stalk in the endometrial cavity.  This was confirmed on follow-up ultrasound and her IUD is lower than expected within the uterus.    She had recent UTI with ESBL E. coli and was treated with Bactrim.  She had a negative multiplex vaginal panel.  He has had negative GC, chlamydia screen as well recently.  She is still having some dysuria, frequency, urgency.  She has a history of kidney stones. She is having some left flank.     She had ACL reconstruction in 2019 and had no trouble with anesthesia    I last saw her in September for follow-up of depression with we increase Lexapro to 20 mg daily and recommended taking trazodone on a more regular basis. She has been doing ok without it. Things are going ok with depression/anxiety. She is not doing any counseling.   Was after that that he began having more of recurrent GYN issues she has not had a follow-up visit.    She finished school last week and is interviewing for [employer] today.     She is up-to-date on immunizations including HPV.  She has not had flu shot this year and is unvaccinated for COVID-19.        12/17/2023     3:28 PM   Preop Questions   1. Have you ever had a heart attack or stroke? No   2. Have you ever had surgery on your heart or blood vessels, such as a stent placement, a coronary artery bypass, or surgery on an artery in your head, neck, heart, or legs? No   3. Do  you have chest pain with activity? No   4. Do you have a history of  heart failure? No   5. Do you currently have a cold, bronchitis or symptoms of other infection? No   6. Do you have a cough, shortness of breath, or wheezing? No   7. Do you or anyone in your family have previous history of blood clots? No   8. Do you or does anyone in your family have a serious bleeding problem such as prolonged bleeding following surgeries or cuts? No   9. Have you ever had problems with anemia or been told to take iron pills? No   10. Have you had any abnormal blood loss such as black, tarry or bloody stools, or abnormal vaginal bleeding? YES - abnormal vaginal bleeding   11. Have you ever had a blood transfusion? No   12. Are you willing to have a blood transfusion if it is medically needed before, during, or after your surgery? Yes   13. Have you or any of your relatives ever had problems with anesthesia? No   14. Do you have sleep apnea, excessive snoring or daytime drowsiness? No   15. Do you have any artifical heart valves or other implanted medical devices like a pacemaker, defibrillator, or continuous glucose monitor? No   16. Do you have artificial joints? No   17. Are you allergic to latex? No   18. Is there any chance that you may be pregnant? No       Health Care Directive:  Patient does not have a Health Care Directive or Living Will: Discussed advance care planning with patient; however, patient declined at this time.    Preoperative Review of :   reviewed - no record of controlled substances prescribed.      She has mild intermittent asthma that has not been an issue.        12/1/2023    10:49 AM 12/6/2023     3:08 PM 12/17/2023     3:27 PM   PHQ   PHQ-9 Total Score 8 12 4   Q9: Thoughts of better off dead/self-harm past 2 weeks Not at all Not at all Not at all          9/6/2023     8:12 PM 12/1/2023    10:49 AM 12/18/2023     9:35 AM   SONIDO-7 SCORE   Total Score 15 (severe anxiety) 12 (moderate anxiety)     Total Score 15 12 8           Review of Systems  Constitutional, neuro, ENT, endocrine, pulmonary, cardiac, gastrointestinal, genitourinary, musculoskeletal, integument and psychiatric systems are negative, except as otherwise noted.    Patient Active Problem List    Diagnosis Date Noted    Occipital neuralgia of left side 03/09/2022     Priority: Medium    Migraine with aura and without status migrainosus, not intractable 03/09/2022     Priority: Medium    Major depressive disorder, single episode, severe without psychotic features (H) 02/09/2022     Priority: Medium    Anxiety 02/09/2022     Priority: Medium    Asthma 02/17/2010     Priority: Medium      Past Medical History:   Diagnosis Date    Closed fracture of phalanx of finger     2015    Migraine without status migrainosus, not intractable     No Comments Provided     Past Surgical History:   Procedure Laterality Date    ARTHROSCOPIC RECONSTRUCTION ANTERIOR CRUCIATE LIGAMENT BONE TENDON BONE AUTOGRAFT Left 02/15/2019    Procedure: Examination Under Anesthesia Left Knee, Left Anterior Cruciate Ligament Reconstruction, Bone Tendon Bone Autograft;  Surgeon: Himanshu Cortez MD;  Location:  OR     Current Outpatient Medications   Medication Sig Dispense Refill    albuterol (PROAIR HFA/PROVENTIL HFA/VENTOLIN HFA) 108 (90 Base) MCG/ACT inhaler Inhale 2 puffs into the lungs every 4 hours as needed for shortness of breath / dyspnea or wheezing 18 g 1    amoxicillin-clavulanate (AUGMENTIN) 875-125 MG tablet Take 1 tablet by mouth 2 times daily for 7 days 14 tablet 0    escitalopram (LEXAPRO) 20 MG tablet Take 1 tablet (20 mg) by mouth daily 30 tablet 11    traZODone (DESYREL) 50 MG tablet Take 1 tablet (50 mg) by mouth At Bedtime 30 tablet 1       No Known Allergies     Social History     Tobacco Use    Smoking status: Never     Passive exposure: Never    Smokeless tobacco: Never   Substance Use Topics    Alcohol use: No     Alcohol/week: 0.0 standard  "drinks of alcohol     Family History   Problem Relation Age of Onset    Asthma Brother         Asthma,winter     History   Drug Use Unknown         Objective     BP 96/68   Pulse 76   Temp 97.6  F (36.4  C) (Temporal)   Resp 16   Ht 1.499 m (4' 11\")   Wt 54.4 kg (120 lb)   LMP 12/02/2023 (Exact Date)   SpO2 97%   Breastfeeding No   BMI 24.24 kg/m      Physical Exam  General Appearance: Pleasant, alert, appropriate appearance for age. No acute distress  Head Exam: Normal. Normocephalic, atraumatic.  Eye Exam: PERRLA, EOMI, conjunctivae, sclerae normal.  Ear Exam: Normal TM's bilaterally. Normal auditory canals and external ears. Non-tender.  Nose Exam: Normal external nose, mucus membranes, and septum.  OroPharynx Exam:  Dental hygiene adequate. Normal buccal mucosa. Normal pharynx.  Neck Exam:  Supple, no masses or nodes. No bruits  Thyroid Exam: No nodules or enlargement.  Chest/Respiratory Exam: Normal chest wall and respirations. Clear to auscultation.  Cardiovascular Exam: Regular rate and rhythm. S1, S2, no murmur, click, gallop, or rubs.  Gastrointestinal Exam: Soft, non-tender, no masses or organomegaly.  No CVA tenderness.  Mild left flank discomfort to deep palpation  Lymphatic Exam: Non-palpable nodes in neck,clavicular, axillary, or inguinal regions.  Musculoskeletal Exam: Back is straight and non-tender, full ROM of upper and lower extremities.  Foot Exam: Left and right foot: good pedal pulses  Skin: no rash or abnormalities  Neurologic Exam:  normal gross motor, tone coordination and no tremor.  Psychiatric Exam: Alert and oriented - appropriate affect.     Recent Labs   Lab Test 03/08/23  0928   HGB 14.5         POTASSIUM 4.1   CR 0.78        Diagnostics:  Results for orders placed or performed during the hospital encounter of 12/18/23   CT Abdomen Pelvis w/o Contrast     Status: None    Narrative    PROCEDURE: CT ABDOMEN PELVIS W/O CONTRAST 12/18/2023 11:13 AM    HISTORY: left " flank pain. history of kidney stones; Left flank pain;  History of kidney stones    COMPARISONS: CT dated 3/1/2020.    Meds/Dose Given: None.    TECHNIQUE: Axial noncontrast enhanced images with coronal and sagittal  reformatted images.    FINDINGS: Lung bases are clear.    No focal abnormality is seen in the liver, spleen, adrenal glands or  in the pancreas. Gallbladder is present.    No renal mass or hydronephrosis is seen. There are a few punctate  calculi in each kidney measuring only millimeters in size. No ureteral  stone is seen. No bladder stone seen.    There is no bowel abnormality. There is no inflammatory change or  focal fluid collection. There is a fairly large amount of stool within  the rectum.    There is an IUD in the uterus. No lymph node enlargement is seen.    There is no aneurysm. There is no focal bone lesion.         Impression    IMPRESSION: Tiny renal calculi. No definite ureteral stone or  hydronephrosis.    TYRONE CHEW MD         SYSTEM ID:  [id number]   Results for orders placed or performed in visit on 12/18/23   Extra Tube     Status: None    Narrative    The following orders were created for panel order Extra Tube.  Procedure                               Abnormality         Status                     ---------                               -----------         ------                     Extra Serum Separator Tu...[[id number]]                      Final result               Extra Purple Top Tube[[id number]]                            Final result               Extra Urine Collection[[id number]]                           Final result                 Please view results for these tests on the individual orders.   Extra Serum Separator Tube (SST)     Status: None   Result Value Ref Range    Hold Specimen JIC    Extra Purple Top Tube     Status: None   Result Value Ref Range    Hold Specimen JIC    Extra Urine Collection     Status: None   Result Value Ref Range    Hold Specimen jic    UA  Macroscopic with reflex to Microscopic and Culture     Status: Abnormal    Specimen: Urine, Midstream   Result Value Ref Range    Color Urine Light Yellow Colorless, Straw, Light Yellow, Yellow    Appearance Urine Slightly Cloudy (A) Clear    Glucose Urine Negative Negative mg/dL    Bilirubin Urine Negative Negative    Ketones Urine Negative Negative mg/dL    Specific Gravity Urine 1.025 1.000 - 1.030    Blood Urine Negative Negative    pH Urine 5.5 5.0 - 9.0    Protein Albumin Urine Negative Negative mg/dL    Urobilinogen Urine Normal Normal, 2.0 mg/dL    Nitrite Urine Negative Negative    Leukocyte Esterase Urine Moderate (A) Negative    Bacteria Urine Few (A) None Seen /HPF    Mucus Urine Present (A) None Seen /LPF    RBC Urine 5 (H) <=2 /HPF    WBC Urine 97 (H) <=5 /HPF    Squamous Epithelials Urine 3 (H) <=1 /HPF    Narrative    Urine Culture ordered based on laboratory criteria   CBC with platelets and differential     Status: None   Result Value Ref Range    WBC Count 9.3 4.0 - 11.0 10e3/uL    RBC Count 4.85 3.80 - 5.20 10e6/uL    Hemoglobin 15.3 11.7 - 15.7 g/dL    Hematocrit 43.8 35.0 - 47.0 %    MCV 90 78 - 100 fL    MCH 31.5 26.5 - 33.0 pg    MCHC 34.9 31.5 - 36.5 g/dL    RDW 12.2 10.0 - 15.0 %    Platelet Count 327 150 - 450 10e3/uL    % Neutrophils 69 %    % Lymphocytes 21 %    % Monocytes 7 %    % Eosinophils 2 %    % Basophils 1 %    % Immature Granulocytes 0 %    NRBCs per 100 WBC 0 <1 /100    Absolute Neutrophils 6.4 1.6 - 8.3 10e3/uL    Absolute Lymphocytes 2.0 0.8 - 5.3 10e3/uL    Absolute Monocytes 0.7 0.0 - 1.3 10e3/uL    Absolute Eosinophils 0.2 0.0 - 0.7 10e3/uL    Absolute Basophils 0.1 0.0 - 0.2 10e3/uL    Absolute Immature Granulocytes 0.0 <=0.4 10e3/uL    Absolute NRBCs 0.0 10e3/uL   Pregnancy, Urine (HCG)     Status: Normal   Result Value Ref Range    hCG Urine Qualitative Negative Negative   CBC with Platelets & Differential     Status: None    Narrative    The following orders were  created for panel order CBC with Platelets & Differential.  Procedure                               Abnormality         Status                     ---------                               -----------         ------                     CBC with platelets and d...[[id number]]                      Final result                 Please view results for these tests on the individual orders.       No EKG required, no history of coronary heart disease, significant arrhythmia, peripheral arterial disease or other structural heart disease.    Revised Cardiac Risk Index (RCRI):  The patient has the following serious cardiovascular risks for perioperative complications:   - No serious cardiac risks = 0 points     RCRI Interpretation: 0 points: Class I (very low risk - 0.4% complication rate)         Signed Electronically by: Domenic Toure MD  Copy of this evaluation report is provided to requesting physician.      Answers submitted by the patient for this visit:  Patient Health Questionnaire (Submitted on 12/17/2023)  If you checked off any problems, how difficult have these problems made it for you to do your work, take care of things at home, or get along with other people?: Somewhat difficult  PHQ9 TOTAL SCORE: 4

## 2023-12-18 NOTE — NURSING NOTE
"Chief Complaint   Patient presents with    Pre-Op Exam     Surgery date 1/10/24       Initial BP 96/68   Pulse 76   Temp 97.6  F (36.4  C) (Temporal)   Resp 16   Ht 1.499 m (4' 11\")   Wt 54.4 kg (120 lb)   LMP 12/02/2023 (Exact Date)   SpO2 97%   Breastfeeding No   BMI 24.24 kg/m   Estimated body mass index is 24.24 kg/m  as calculated from the following:    Height as of this encounter: 1.499 m (4' 11\").    Weight as of this encounter: 54.4 kg (120 lb).  Medication Review: complete    The next two questions are to help us understand your food security.  If you are feeling you need any assistance in this area, we have resources available to support you today.          12/17/2023   SDOH- Food Insecurity   Within the past 12 months, did you worry that your food would run out before you got money to buy more? N   Within the past 12 months, did the food you bought just not last and you didn t have money to get more? N         Health Care Directive:  Patient does not have a Health Care Directive or Living Will: Discussed advance care planning with patient; however, patient declined at this time.    Amy White LPN      "

## 2023-12-18 NOTE — H&P (VIEW-ONLY)
[facility]  [address]  [place] [address]  Phone: [phone]  Fax: [phone]  Primary Provider: Jj Toure  Pre-op Performing Provider: JJ TOURE      PREOPERATIVE EVALUATION:  Today's date: 12/18/2023    Lilliana is a 18 year old, presenting for the following:  Pre-Op Exam (Surgery date 1/10/24)        12/18/2023     9:33 AM   Additional Questions   Roomed by Amy White LPN       Surgical Information:  Surgery/Procedure: Hysteroscopy  Surgery Location: [facility]  Surgeon: Dr Shefali Tyson  Surgery Date: 01/10/24  Time of Surgery: TBD  Where patient plans to recover: At home with family  Fax number for surgical facility: Note does not need to be faxed, will be available electronically in Epic.    Diagnoses and associated orders for this visit:  Preop general physical exam  -     CBC with Platelets & Differential; Future  -     UA reflex to Microscopic; Future  -     Pregnancy, Urine (HCG); Future  -     CBC with Platelets & Differential  -     Pregnancy, Urine (HCG)    Endometrial polyp  -     CBC with Platelets & Differential; Future  -     CBC with Platelets & Differential    Abnormal vaginal bleeding  -     CBC with Platelets & Differential; Future  -     Pregnancy, Urine (HCG); Future  -     CBC with Platelets & Differential  -     Pregnancy, Urine (HCG)    Pain in female genitalia on intercourse    Major depressive disorder, recurrent severe without psychotic features (H)    Dysuria  -     UA Macroscopic with reflex to Microscopic and Culture; Future  -     UA Macroscopic with reflex to Microscopic and Culture  -     Urine Culture  -     amoxicillin-clavulanate (AUGMENTIN) 875-125 MG tablet; Take 1 tablet by mouth 2 times daily for 7 days    Left flank pain  -     CT Abdomen Pelvis w/o Contrast; Future    History of kidney stones  -     CT Abdomen Pelvis w/o Contrast; Future    Other orders  -     Extra Tube; Future  -     Extra Tube      Okay to go  ahead with planned procedure.  Question UTI pending culture we will treat with Augmentin.  No evidence of ureteral stone.    Domenic Toure MD     Subjective       HPI related to upcoming procedure: Preoperative risk assessment consultation was requested on Lilliana Viera by Clari Tyson MD prior to hysteroscopy and D&C with IUD insertion.   This is scheduled for 1024. I am asked to see this patient for preoperative clearance prior to this procedure.     Patient has had uterine bleeding and cramping after intercourse and on pelvic ultrasound was noted to have an 8 mm polyp on a stalk in the endometrial cavity.  This was confirmed on follow-up ultrasound and her IUD is lower than expected within the uterus.    She had recent UTI with ESBL E. coli and was treated with Bactrim.  She had a negative multiplex vaginal panel.  He has had negative GC, chlamydia screen as well recently.  She is still having some dysuria, frequency, urgency.  She has a history of kidney stones. She is having some left flank.     She had ACL reconstruction in 2019 and had no trouble with anesthesia    I last saw her in September for follow-up of depression with we increase Lexapro to 20 mg daily and recommended taking trazodone on a more regular basis. She has been doing ok without it. Things are going ok with depression/anxiety. She is not doing any counseling.   Was after that that he began having more of recurrent GYN issues she has not had a follow-up visit.    She finished school last week and is interviewing for [employer] today.     She is up-to-date on immunizations including HPV.  She has not had flu shot this year and is unvaccinated for COVID-19.        12/17/2023     3:28 PM   Preop Questions   1. Have you ever had a heart attack or stroke? No   2. Have you ever had surgery on your heart or blood vessels, such as a stent placement, a coronary artery bypass, or surgery on an artery in your head, neck, heart, or legs? No   3. Do  you have chest pain with activity? No   4. Do you have a history of  heart failure? No   5. Do you currently have a cold, bronchitis or symptoms of other infection? No   6. Do you have a cough, shortness of breath, or wheezing? No   7. Do you or anyone in your family have previous history of blood clots? No   8. Do you or does anyone in your family have a serious bleeding problem such as prolonged bleeding following surgeries or cuts? No   9. Have you ever had problems with anemia or been told to take iron pills? No   10. Have you had any abnormal blood loss such as black, tarry or bloody stools, or abnormal vaginal bleeding? YES - abnormal vaginal bleeding   11. Have you ever had a blood transfusion? No   12. Are you willing to have a blood transfusion if it is medically needed before, during, or after your surgery? Yes   13. Have you or any of your relatives ever had problems with anesthesia? No   14. Do you have sleep apnea, excessive snoring or daytime drowsiness? No   15. Do you have any artifical heart valves or other implanted medical devices like a pacemaker, defibrillator, or continuous glucose monitor? No   16. Do you have artificial joints? No   17. Are you allergic to latex? No   18. Is there any chance that you may be pregnant? No       Health Care Directive:  Patient does not have a Health Care Directive or Living Will: Discussed advance care planning with patient; however, patient declined at this time.    Preoperative Review of :   reviewed - no record of controlled substances prescribed.      She has mild intermittent asthma that has not been an issue.        12/1/2023    10:49 AM 12/6/2023     3:08 PM 12/17/2023     3:27 PM   PHQ   PHQ-9 Total Score 8 12 4   Q9: Thoughts of better off dead/self-harm past 2 weeks Not at all Not at all Not at all          9/6/2023     8:12 PM 12/1/2023    10:49 AM 12/18/2023     9:35 AM   SONIDO-7 SCORE   Total Score 15 (severe anxiety) 12 (moderate anxiety)     Total Score 15 12 8           Review of Systems  Constitutional, neuro, ENT, endocrine, pulmonary, cardiac, gastrointestinal, genitourinary, musculoskeletal, integument and psychiatric systems are negative, except as otherwise noted.    Patient Active Problem List    Diagnosis Date Noted    Occipital neuralgia of left side 03/09/2022     Priority: Medium    Migraine with aura and without status migrainosus, not intractable 03/09/2022     Priority: Medium    Major depressive disorder, single episode, severe without psychotic features (H) 02/09/2022     Priority: Medium    Anxiety 02/09/2022     Priority: Medium    Asthma 02/17/2010     Priority: Medium      Past Medical History:   Diagnosis Date    Closed fracture of phalanx of finger     2015    Migraine without status migrainosus, not intractable     No Comments Provided     Past Surgical History:   Procedure Laterality Date    ARTHROSCOPIC RECONSTRUCTION ANTERIOR CRUCIATE LIGAMENT BONE TENDON BONE AUTOGRAFT Left 02/15/2019    Procedure: Examination Under Anesthesia Left Knee, Left Anterior Cruciate Ligament Reconstruction, Bone Tendon Bone Autograft;  Surgeon: Himanshu Cortez MD;  Location:  OR     Current Outpatient Medications   Medication Sig Dispense Refill    albuterol (PROAIR HFA/PROVENTIL HFA/VENTOLIN HFA) 108 (90 Base) MCG/ACT inhaler Inhale 2 puffs into the lungs every 4 hours as needed for shortness of breath / dyspnea or wheezing 18 g 1    amoxicillin-clavulanate (AUGMENTIN) 875-125 MG tablet Take 1 tablet by mouth 2 times daily for 7 days 14 tablet 0    escitalopram (LEXAPRO) 20 MG tablet Take 1 tablet (20 mg) by mouth daily 30 tablet 11    traZODone (DESYREL) 50 MG tablet Take 1 tablet (50 mg) by mouth At Bedtime 30 tablet 1       No Known Allergies     Social History     Tobacco Use    Smoking status: Never     Passive exposure: Never    Smokeless tobacco: Never   Substance Use Topics    Alcohol use: No     Alcohol/week: 0.0 standard  "drinks of alcohol     Family History   Problem Relation Age of Onset    Asthma Brother         Asthma,winter     History   Drug Use Unknown         Objective     BP 96/68   Pulse 76   Temp 97.6  F (36.4  C) (Temporal)   Resp 16   Ht 1.499 m (4' 11\")   Wt 54.4 kg (120 lb)   LMP 12/02/2023 (Exact Date)   SpO2 97%   Breastfeeding No   BMI 24.24 kg/m      Physical Exam  General Appearance: Pleasant, alert, appropriate appearance for age. No acute distress  Head Exam: Normal. Normocephalic, atraumatic.  Eye Exam: PERRLA, EOMI, conjunctivae, sclerae normal.  Ear Exam: Normal TM's bilaterally. Normal auditory canals and external ears. Non-tender.  Nose Exam: Normal external nose, mucus membranes, and septum.  OroPharynx Exam:  Dental hygiene adequate. Normal buccal mucosa. Normal pharynx.  Neck Exam:  Supple, no masses or nodes. No bruits  Thyroid Exam: No nodules or enlargement.  Chest/Respiratory Exam: Normal chest wall and respirations. Clear to auscultation.  Cardiovascular Exam: Regular rate and rhythm. S1, S2, no murmur, click, gallop, or rubs.  Gastrointestinal Exam: Soft, non-tender, no masses or organomegaly.  No CVA tenderness.  Mild left flank discomfort to deep palpation  Lymphatic Exam: Non-palpable nodes in neck,clavicular, axillary, or inguinal regions.  Musculoskeletal Exam: Back is straight and non-tender, full ROM of upper and lower extremities.  Foot Exam: Left and right foot: good pedal pulses  Skin: no rash or abnormalities  Neurologic Exam:  normal gross motor, tone coordination and no tremor.  Psychiatric Exam: Alert and oriented - appropriate affect.     Recent Labs   Lab Test 03/08/23  0928   HGB 14.5         POTASSIUM 4.1   CR 0.78        Diagnostics:  Results for orders placed or performed during the hospital encounter of 12/18/23   CT Abdomen Pelvis w/o Contrast     Status: None    Narrative    PROCEDURE: CT ABDOMEN PELVIS W/O CONTRAST 12/18/2023 11:13 AM    HISTORY: left " flank pain. history of kidney stones; Left flank pain;  History of kidney stones    COMPARISONS: CT dated 3/1/2020.    Meds/Dose Given: None.    TECHNIQUE: Axial noncontrast enhanced images with coronal and sagittal  reformatted images.    FINDINGS: Lung bases are clear.    No focal abnormality is seen in the liver, spleen, adrenal glands or  in the pancreas. Gallbladder is present.    No renal mass or hydronephrosis is seen. There are a few punctate  calculi in each kidney measuring only millimeters in size. No ureteral  stone is seen. No bladder stone seen.    There is no bowel abnormality. There is no inflammatory change or  focal fluid collection. There is a fairly large amount of stool within  the rectum.    There is an IUD in the uterus. No lymph node enlargement is seen.    There is no aneurysm. There is no focal bone lesion.         Impression    IMPRESSION: Tiny renal calculi. No definite ureteral stone or  hydronephrosis.    TYRONE CHEW MD         SYSTEM ID:  [id number]   Results for orders placed or performed in visit on 12/18/23   Extra Tube     Status: None    Narrative    The following orders were created for panel order Extra Tube.  Procedure                               Abnormality         Status                     ---------                               -----------         ------                     Extra Serum Separator Tu...[[id number]]                      Final result               Extra Purple Top Tube[[id number]]                            Final result               Extra Urine Collection[[id number]]                           Final result                 Please view results for these tests on the individual orders.   Extra Serum Separator Tube (SST)     Status: None   Result Value Ref Range    Hold Specimen JIC    Extra Purple Top Tube     Status: None   Result Value Ref Range    Hold Specimen JIC    Extra Urine Collection     Status: None   Result Value Ref Range    Hold Specimen jic    UA  Macroscopic with reflex to Microscopic and Culture     Status: Abnormal    Specimen: Urine, Midstream   Result Value Ref Range    Color Urine Light Yellow Colorless, Straw, Light Yellow, Yellow    Appearance Urine Slightly Cloudy (A) Clear    Glucose Urine Negative Negative mg/dL    Bilirubin Urine Negative Negative    Ketones Urine Negative Negative mg/dL    Specific Gravity Urine 1.025 1.000 - 1.030    Blood Urine Negative Negative    pH Urine 5.5 5.0 - 9.0    Protein Albumin Urine Negative Negative mg/dL    Urobilinogen Urine Normal Normal, 2.0 mg/dL    Nitrite Urine Negative Negative    Leukocyte Esterase Urine Moderate (A) Negative    Bacteria Urine Few (A) None Seen /HPF    Mucus Urine Present (A) None Seen /LPF    RBC Urine 5 (H) <=2 /HPF    WBC Urine 97 (H) <=5 /HPF    Squamous Epithelials Urine 3 (H) <=1 /HPF    Narrative    Urine Culture ordered based on laboratory criteria   CBC with platelets and differential     Status: None   Result Value Ref Range    WBC Count 9.3 4.0 - 11.0 10e3/uL    RBC Count 4.85 3.80 - 5.20 10e6/uL    Hemoglobin 15.3 11.7 - 15.7 g/dL    Hematocrit 43.8 35.0 - 47.0 %    MCV 90 78 - 100 fL    MCH 31.5 26.5 - 33.0 pg    MCHC 34.9 31.5 - 36.5 g/dL    RDW 12.2 10.0 - 15.0 %    Platelet Count 327 150 - 450 10e3/uL    % Neutrophils 69 %    % Lymphocytes 21 %    % Monocytes 7 %    % Eosinophils 2 %    % Basophils 1 %    % Immature Granulocytes 0 %    NRBCs per 100 WBC 0 <1 /100    Absolute Neutrophils 6.4 1.6 - 8.3 10e3/uL    Absolute Lymphocytes 2.0 0.8 - 5.3 10e3/uL    Absolute Monocytes 0.7 0.0 - 1.3 10e3/uL    Absolute Eosinophils 0.2 0.0 - 0.7 10e3/uL    Absolute Basophils 0.1 0.0 - 0.2 10e3/uL    Absolute Immature Granulocytes 0.0 <=0.4 10e3/uL    Absolute NRBCs 0.0 10e3/uL   Pregnancy, Urine (HCG)     Status: Normal   Result Value Ref Range    hCG Urine Qualitative Negative Negative   CBC with Platelets & Differential     Status: None    Narrative    The following orders were  created for panel order CBC with Platelets & Differential.  Procedure                               Abnormality         Status                     ---------                               -----------         ------                     CBC with platelets and d...[[id number]]                      Final result                 Please view results for these tests on the individual orders.       No EKG required, no history of coronary heart disease, significant arrhythmia, peripheral arterial disease or other structural heart disease.    Revised Cardiac Risk Index (RCRI):  The patient has the following serious cardiovascular risks for perioperative complications:   - No serious cardiac risks = 0 points     RCRI Interpretation: 0 points: Class I (very low risk - 0.4% complication rate)         Signed Electronically by: Domenic Toure MD  Copy of this evaluation report is provided to requesting physician.      Answers submitted by the patient for this visit:  Patient Health Questionnaire (Submitted on 12/17/2023)  If you checked off any problems, how difficult have these problems made it for you to do your work, take care of things at home, or get along with other people?: Somewhat difficult  PHQ9 TOTAL SCORE: 4

## 2023-12-20 LAB — BACTERIA UR CULT: ABNORMAL

## 2023-12-29 ENCOUNTER — MYC MEDICAL ADVICE (OUTPATIENT)
Dept: FAMILY MEDICINE | Facility: OTHER | Age: 18
End: 2023-12-29
Payer: COMMERCIAL

## 2023-12-30 ENCOUNTER — OFFICE VISIT (OUTPATIENT)
Dept: FAMILY MEDICINE | Facility: OTHER | Age: 18
End: 2023-12-30
Payer: COMMERCIAL

## 2023-12-30 VITALS
BODY MASS INDEX: 24.19 KG/M2 | OXYGEN SATURATION: 99 % | HEART RATE: 66 BPM | HEIGHT: 59 IN | RESPIRATION RATE: 12 BRPM | TEMPERATURE: 98.7 F | WEIGHT: 120 LBS | SYSTOLIC BLOOD PRESSURE: 92 MMHG | DIASTOLIC BLOOD PRESSURE: 58 MMHG

## 2023-12-30 DIAGNOSIS — B37.31 VAGINAL CANDIDIASIS: Primary | ICD-10-CM

## 2023-12-30 DIAGNOSIS — N39.9 URINARY PROBLEM IN FEMALE: ICD-10-CM

## 2023-12-30 LAB
ALBUMIN UR-MCNC: NEGATIVE MG/DL
APPEARANCE UR: CLEAR
BACTERIAL VAGINOSIS VAG-IMP: NEGATIVE
BILIRUB UR QL STRIP: NEGATIVE
CANDIDA DNA VAG QL NAA+PROBE: DETECTED
CANDIDA GLABRATA / CANDIDA KRUSEI DNA: NOT DETECTED
COLOR UR AUTO: NORMAL
GLUCOSE UR STRIP-MCNC: NEGATIVE MG/DL
HGB UR QL STRIP: NEGATIVE
KETONES UR STRIP-MCNC: NEGATIVE MG/DL
LEUKOCYTE ESTERASE UR QL STRIP: NEGATIVE
NITRATE UR QL: NEGATIVE
PH UR STRIP: 5.5 [PH] (ref 5–9)
SP GR UR STRIP: 1.02 (ref 1–1.03)
T VAGINALIS DNA VAG QL NAA+PROBE: NOT DETECTED
UROBILINOGEN UR STRIP-MCNC: NORMAL MG/DL

## 2023-12-30 PROCEDURE — 81003 URINALYSIS AUTO W/O SCOPE: CPT | Mod: ZL

## 2023-12-30 PROCEDURE — 0352U MULTIPLEX VAGINAL PANEL BY PCR: CPT | Mod: ZL

## 2023-12-30 PROCEDURE — 99214 OFFICE O/P EST MOD 30 MIN: CPT

## 2023-12-30 RX ORDER — FLUCONAZOLE 150 MG/1
150 TABLET ORAL ONCE
Qty: 1 TABLET | Refills: 0 | Status: SHIPPED | OUTPATIENT
Start: 2023-12-30 | End: 2023-12-30

## 2023-12-30 ASSESSMENT — PAIN SCALES - GENERAL: PAINLEVEL: EXTREME PAIN (8)

## 2023-12-30 NOTE — PROGRESS NOTES
ASSESSMENT/PLAN:    (B37.31) Vaginal candidiasis  (primary encounter diagnosis); (N39.9) Urinary problem in female  Comment: Patient has been having dysuria for the past month or so.  She has completed treatment for Staphylococcus saprophyticus UTI infection 3 days ago where she was treated with Augmentin x 1 week.  She reports that this did not improve the pain with the urination and symptoms are persisting.  She denies any fevers.  No hematuria.  She does have back pain but currently has renal stones which showed up on her CT scan a week ago.  On exam today she is afebrile, no CVA or suprapubic tenderness appreciated.  Urinalysis was negative for infection.  Vaginal multiplex testing was obtained and was positive for Candida.  Will opt to treat with Diflucan at this time.  Follow-up if symptoms are persistent.  Plan: UA Macroscopic with reflex to Microscopic and         Culture, fluconazole (DIFLUCAN) 150 MG tablet        Multiplex Vaginal Panel by PCR     Discussed warning signs/symptoms indicative of need to f/u    Follow up if symptoms persist or worsen or concerns    I have reviewed the nursing notes.  I have reviewed the findings, diagnosis, plan and need for follow up with the patient.    I explained my diagnostic considerations and recommendations to the patient, who voiced understanding and agreement with the treatment plan. All questions were answered. We discussed potential side effects of any prescribed or recommended therapies, as well as expectations for response to treatments.    AUNG CABRALES CNP  12/30/2023  2:44 PM    HPI:    Lilliana Viera is a 18 year old female  who presents to [facility] today for concerns of UTI.    possible UTI, x 1  month  patient completed treatment for Staphylococcus saprophyticus 3 days ago and is having ongoing symptoms.  She was treated with 1 week of Augmentin.    Symptoms: dysuria, this started a few weeks ago. The antibiotics did not help  YES: she  endorses  back or side pain, currently has renal stones which showed on CT scan 1 week ago.   No hematuria/blood in urine  Last Urination: Today  YES: she is  able to completely void/empty  No fevers, chills, vomiting, endorses nausea  YES: constipation  change in bowel habits (diarrhea, constipation, etc.)  YES: she endorses pruritus.  vaginal/penile symptoms (discharge, pain, itching, sores, etc.)  No exposures to STIs/STDs, sexually active with same partner  No recent swimming/use of hot tubs/swimming pools/lakes    YES: she endorses  history of UTIs    LMP: 12/4/23     Allergies: No known medication allergies    PCP: [facility]    Took monistat for yeast yesterday.     Past Medical History:   Diagnosis Date    Closed fracture of phalanx of finger     2015    Migraine without status migrainosus, not intractable     No Comments Provided     Past Surgical History:   Procedure Laterality Date    ARTHROSCOPIC RECONSTRUCTION ANTERIOR CRUCIATE LIGAMENT BONE TENDON BONE AUTOGRAFT Left 02/15/2019    Procedure: Examination Under Anesthesia Left Knee, Left Anterior Cruciate Ligament Reconstruction, Bone Tendon Bone Autograft;  Surgeon: Himanshu Cortez MD;  Location:  OR     Social History     Tobacco Use    Smoking status: Never     Passive exposure: Never    Smokeless tobacco: Never   Substance Use Topics    Alcohol use: No     Alcohol/week: 0.0 standard drinks of alcohol     Current Outpatient Medications   Medication Sig Dispense Refill    albuterol (PROAIR HFA/PROVENTIL HFA/VENTOLIN HFA) 108 (90 Base) MCG/ACT inhaler Inhale 2 puffs into the lungs every 4 hours as needed for shortness of breath / dyspnea or wheezing 18 g 1    escitalopram (LEXAPRO) 20 MG tablet Take 1 tablet (20 mg) by mouth daily 30 tablet 11    fluconazole (DIFLUCAN) 150 MG tablet Take 1 tablet (150 mg) by mouth once for 1 dose 1 tablet 0    traZODone (DESYREL) 50 MG tablet Take 1 tablet (50 mg) by mouth At Bedtime 30 tablet 1     No  "Known Allergies  Past medical history, past surgical history, current medications and allergies reviewed and accurate to the best of my knowledge.      ROS:  Refer to HPI    BP 92/58 (BP Location: Left arm, Patient Position: Sitting, Cuff Size: Adult Regular)   Pulse 66   Temp 98.7  F (37.1  C) (Tympanic)   Resp 12   Ht 1.499 m (4' 11\")   Wt 54.4 kg (120 lb)   LMP 12/02/2023 (Exact Date)   SpO2 99%   BMI 24.24 kg/m      EXAM:  General Appearance: Well appearing 18 year old female, appropriate appearance for age. No acute distress   Eyes: conjunctivae normal without erythema or irritation, corneas clear, no drainage or crusting, no eyelid swelling, pupils equal   Oropharynx: moist mucous membranes, voice clear.    Nose:  Bilateral nares: no erythema, no edema, no drainage or congestion   Neck: supple without adenopathy  Respiratory: normal chest wall and respirations.  Normal effort.  Clear to auscultation bilaterally, no wheezing, crackles or rhonchi.  No increased work of breathing.  No cough appreciated.  Cardiac: RRR with no murmurs  Abdomen: soft, nontender, no rigidity, no rebound tenderness or guarding, normal bowel sounds present  :  No suprapubic tenderness to palpation.  Absent CVA tenderness to palpation.    Musculoskeletal:  Equal movement of bilateral upper extremities.  Equal movement of bilateral lower extremities.  Normal gait.    Dermatological: no rashes noted of exposed skin  Neuro: Alert and oriented to person, place, and time.  Cranial nerves II-XII grossly intact with no focal or lateralizing deficits.  Muscle tone normal.  Gait normal. No tremor.   Psychological: normal affect, alert, oriented, and pleasant.     Labs:  Results for orders placed or performed in visit on 12/30/23   UA Macroscopic with reflex to Microscopic and Culture     Status: Normal    Specimen: Urine, Midstream   Result Value Ref Range    Color Urine Light Yellow Colorless, Straw, Light Yellow, Yellow    Appearance " Urine Clear Clear    Glucose Urine Negative Negative mg/dL    Bilirubin Urine Negative Negative    Ketones Urine Negative Negative mg/dL    Specific Gravity Urine 1.023 1.000 - 1.030    Blood Urine Negative Negative    pH Urine 5.5 5.0 - 9.0    Protein Albumin Urine Negative Negative mg/dL    Urobilinogen Urine Normal Normal, 2.0 mg/dL    Nitrite Urine Negative Negative    Leukocyte Esterase Urine Negative Negative    Narrative    Microscopic not indicated   Multiplex Vaginal Panel by PCR     Status: Abnormal    Specimen: Vagina; Swab   Result Value Ref Range    Bacterial Vaginosis Organism DNA Negative Negative    Candida Group DNA Detected (A) Not Detected    Candida glabrata / Alecia krusei DNA Not Detected Not Detected    Trichomonas vaginalis DNA Not Detected Not Detected    Narrative    The Xpert  Xpress MVP test, performed on the [employer], is an automated, qualitative in vitro diagnostic test for the detection of DNA targets from anaerobic bacteria associated with bacterial vaginosis, Candida species associated with vulvovaginal candidiasis, and Trichomonas vaginalis. The assay uses clinician-collected and self-collected vaginal swabs from patients who are symptomatic for vaginitis/ vaginosis. The Xpert  Xpress MVP test utilizes real-time polymerase chain reaction (PCR) for the amplification of specific DNA targets and utilizes fluorogenic target-specific hybridization probes to detect and differentiate DNA. It is intended to aid in the diagnosis of vaginal infections in women with a clinical presentation consistent with bacterial vaginosis, vulvovaginal candidiasis, or trichomoniasis.   The assay targets three anaerobic microorgansims that are associated with bacterial vaginosis (BV). Other organisms that are not detected by the Xpert  Xpress MVP test have also been reported to be associated with BV. The BV organism and Candida species targets of the Xpert  Xpress MVP test can be  commensal in women; positive results must be considered in conjunction with other clinical and patient information to determine the disease status.         Answers submitted by the patient for this visit:  Patient Health Questionnaire (Submitted on 12/30/2023)  If you checked off any problems, how difficult have these problems made it for you to do your work, take care of things at home, or get along with other people?: Somewhat difficult  PHQ9 TOTAL SCORE: 3

## 2023-12-30 NOTE — NURSING NOTE
"Chief Complaint   Patient presents with    Urinary Problem     X 1 month ongoing - finished rx for uti 3 days ago     Patient has had urinary sx for over 1 month - recurrent bladder inf sx.  Treating pain with tylenol    Initial BP 92/58 (BP Location: Left arm, Patient Position: Sitting, Cuff Size: Adult Regular)   Pulse 66   Temp 98.7  F (37.1  C) (Tympanic)   Resp 12   Ht 1.499 m (4' 11\")   Wt 54.4 kg (120 lb)   LMP 12/02/2023 (Exact Date)   SpO2 99%   BMI 24.24 kg/m   Estimated body mass index is 24.24 kg/m  as calculated from the following:    Height as of this encounter: 1.499 m (4' 11\").    Weight as of this encounter: 54.4 kg (120 lb).     Advance Care Directive on file? no    FOOD SECURITY SCREENING QUESTIONS:    The next two questions are to help us understand your food security.  If you are feeling you need any assistance in this area, we have resources available to support you today.    Hunger Vital Signs:  Within the past 12 months we worried whether our food would run out before we got money to buy more. Never  Within the past 12 months the food we bought just didn't last and we didn't have money to get more. Never  Kierra Whalen LPN,WENDY on 12/30/2023 at 2:55 PM      Kierra Whalen LPN     "

## 2024-01-09 ENCOUNTER — ANESTHESIA EVENT (OUTPATIENT)
Dept: SURGERY | Facility: OTHER | Age: 19
End: 2024-01-09
Payer: COMMERCIAL

## 2024-01-10 ENCOUNTER — ANESTHESIA (OUTPATIENT)
Dept: SURGERY | Facility: OTHER | Age: 19
End: 2024-01-10
Payer: COMMERCIAL

## 2024-01-10 ENCOUNTER — HOSPITAL ENCOUNTER (OUTPATIENT)
Facility: OTHER | Age: 19
Discharge: HOME | End: 2024-01-10
Attending: STUDENT IN AN ORGANIZED HEALTH CARE EDUCATION/TRAINING PROGRAM | Admitting: STUDENT IN AN ORGANIZED HEALTH CARE EDUCATION/TRAINING PROGRAM
Payer: COMMERCIAL

## 2024-01-10 VITALS
DIASTOLIC BLOOD PRESSURE: 65 MMHG | RESPIRATION RATE: 12 BRPM | HEART RATE: 79 BPM | HEIGHT: 59 IN | OXYGEN SATURATION: 99 % | WEIGHT: 120 LBS | BODY MASS INDEX: 24.19 KG/M2 | SYSTOLIC BLOOD PRESSURE: 103 MMHG | TEMPERATURE: 97.5 F

## 2024-01-10 DIAGNOSIS — Z98.890 STATUS POST HYSTEROSCOPIC POLYPECTOMY: Primary | ICD-10-CM

## 2024-01-10 LAB — HCG UR QL: NEGATIVE

## 2024-01-10 PROCEDURE — 58300 INSERT INTRAUTERINE DEVICE: CPT | Performed by: STUDENT IN AN ORGANIZED HEALTH CARE EDUCATION/TRAINING PROGRAM

## 2024-01-10 PROCEDURE — 250N000009 HC RX 250: Performed by: NURSE ANESTHETIST, CERTIFIED REGISTERED

## 2024-01-10 PROCEDURE — 250N000011 HC RX IP 250 OP 636: Performed by: STUDENT IN AN ORGANIZED HEALTH CARE EDUCATION/TRAINING PROGRAM

## 2024-01-10 PROCEDURE — 58301 REMOVE INTRAUTERINE DEVICE: CPT | Performed by: STUDENT IN AN ORGANIZED HEALTH CARE EDUCATION/TRAINING PROGRAM

## 2024-01-10 PROCEDURE — 258N000003 HC RX IP 258 OP 636: Performed by: NURSE ANESTHETIST, CERTIFIED REGISTERED

## 2024-01-10 PROCEDURE — 360N000076 HC SURGERY LEVEL 3, PER MIN: Performed by: STUDENT IN AN ORGANIZED HEALTH CARE EDUCATION/TRAINING PROGRAM

## 2024-01-10 PROCEDURE — 272N000001 HC OR GENERAL SUPPLY STERILE: Performed by: STUDENT IN AN ORGANIZED HEALTH CARE EDUCATION/TRAINING PROGRAM

## 2024-01-10 PROCEDURE — 88305 TISSUE EXAM BY PATHOLOGIST: CPT

## 2024-01-10 PROCEDURE — 250N000009 HC RX 250: Performed by: STUDENT IN AN ORGANIZED HEALTH CARE EDUCATION/TRAINING PROGRAM

## 2024-01-10 PROCEDURE — 999N000141 HC STATISTIC PRE-PROCEDURE NURSING ASSESSMENT: Performed by: STUDENT IN AN ORGANIZED HEALTH CARE EDUCATION/TRAINING PROGRAM

## 2024-01-10 PROCEDURE — 710N000012 HC RECOVERY PHASE 2, PER MINUTE: Performed by: STUDENT IN AN ORGANIZED HEALTH CARE EDUCATION/TRAINING PROGRAM

## 2024-01-10 PROCEDURE — 81025 URINE PREGNANCY TEST: CPT | Performed by: STUDENT IN AN ORGANIZED HEALTH CARE EDUCATION/TRAINING PROGRAM

## 2024-01-10 PROCEDURE — 370N000017 HC ANESTHESIA TECHNICAL FEE, PER MIN: Performed by: STUDENT IN AN ORGANIZED HEALTH CARE EDUCATION/TRAINING PROGRAM

## 2024-01-10 PROCEDURE — 58558 HYSTEROSCOPY BIOPSY: CPT | Performed by: NURSE ANESTHETIST, CERTIFIED REGISTERED

## 2024-01-10 PROCEDURE — 258N000001 HC RX 258: Performed by: STUDENT IN AN ORGANIZED HEALTH CARE EDUCATION/TRAINING PROGRAM

## 2024-01-10 PROCEDURE — 250N000011 HC RX IP 250 OP 636: Performed by: NURSE ANESTHETIST, CERTIFIED REGISTERED

## 2024-01-10 PROCEDURE — 58558 HYSTEROSCOPY BIOPSY: CPT | Performed by: STUDENT IN AN ORGANIZED HEALTH CARE EDUCATION/TRAINING PROGRAM

## 2024-01-10 RX ORDER — PROPOFOL 10 MG/ML
INJECTION, EMULSION INTRAVENOUS PRN
Status: DISCONTINUED | OUTPATIENT
Start: 2024-01-10 | End: 2024-01-10

## 2024-01-10 RX ORDER — HYDROMORPHONE HCL IN WATER/PF 6 MG/30 ML
0.2 PATIENT CONTROLLED ANALGESIA SYRINGE INTRAVENOUS EVERY 5 MIN PRN
Status: DISCONTINUED | OUTPATIENT
Start: 2024-01-10 | End: 2024-01-10 | Stop reason: HOSPADM

## 2024-01-10 RX ORDER — IBUPROFEN 200 MG
800 TABLET ORAL ONCE
Status: DISCONTINUED | OUTPATIENT
Start: 2024-01-10 | End: 2024-01-10 | Stop reason: HOSPADM

## 2024-01-10 RX ORDER — ACETAMINOPHEN 325 MG/1
975 TABLET ORAL ONCE
Status: DISCONTINUED | OUTPATIENT
Start: 2024-01-10 | End: 2024-01-10 | Stop reason: HOSPADM

## 2024-01-10 RX ORDER — OXYCODONE HYDROCHLORIDE 5 MG/1
5-10 TABLET ORAL EVERY 4 HOURS PRN
Qty: 3 TABLET | Refills: 0 | Status: SHIPPED | OUTPATIENT
Start: 2024-01-10 | End: 2024-01-24

## 2024-01-10 RX ORDER — ONDANSETRON 2 MG/ML
4 INJECTION INTRAMUSCULAR; INTRAVENOUS EVERY 30 MIN PRN
Status: DISCONTINUED | OUTPATIENT
Start: 2024-01-10 | End: 2024-01-10 | Stop reason: HOSPADM

## 2024-01-10 RX ORDER — OXYCODONE HYDROCHLORIDE 5 MG/1
5 TABLET ORAL
Status: DISCONTINUED | OUTPATIENT
Start: 2024-01-10 | End: 2024-01-10 | Stop reason: HOSPADM

## 2024-01-10 RX ORDER — SODIUM CHLORIDE, SODIUM LACTATE, POTASSIUM CHLORIDE, CALCIUM CHLORIDE 600; 310; 30; 20 MG/100ML; MG/100ML; MG/100ML; MG/100ML
INJECTION, SOLUTION INTRAVENOUS CONTINUOUS
Status: DISCONTINUED | OUTPATIENT
Start: 2024-01-10 | End: 2024-01-10 | Stop reason: HOSPADM

## 2024-01-10 RX ORDER — DEXAMETHASONE SODIUM PHOSPHATE 4 MG/ML
INJECTION, SOLUTION INTRA-ARTICULAR; INTRALESIONAL; INTRAMUSCULAR; INTRAVENOUS; SOFT TISSUE PRN
Status: DISCONTINUED | OUTPATIENT
Start: 2024-01-10 | End: 2024-01-10

## 2024-01-10 RX ORDER — NALOXONE HYDROCHLORIDE 0.4 MG/ML
0.4 INJECTION, SOLUTION INTRAMUSCULAR; INTRAVENOUS; SUBCUTANEOUS
Status: DISCONTINUED | OUTPATIENT
Start: 2024-01-10 | End: 2024-01-10 | Stop reason: HOSPADM

## 2024-01-10 RX ORDER — LIDOCAINE 40 MG/G
CREAM TOPICAL
Status: DISCONTINUED | OUTPATIENT
Start: 2024-01-10 | End: 2024-01-10 | Stop reason: HOSPADM

## 2024-01-10 RX ORDER — ONDANSETRON 4 MG/1
4 TABLET, ORALLY DISINTEGRATING ORAL EVERY 30 MIN PRN
Status: DISCONTINUED | OUTPATIENT
Start: 2024-01-10 | End: 2024-01-10 | Stop reason: HOSPADM

## 2024-01-10 RX ORDER — PROPOFOL 10 MG/ML
INJECTION, EMULSION INTRAVENOUS CONTINUOUS PRN
Status: DISCONTINUED | OUTPATIENT
Start: 2024-01-10 | End: 2024-01-10

## 2024-01-10 RX ORDER — IBUPROFEN 800 MG/1
800 TABLET, FILM COATED ORAL EVERY 8 HOURS PRN
Qty: 15 TABLET | Refills: 0 | Status: SHIPPED | OUTPATIENT
Start: 2024-01-10 | End: 2024-01-24

## 2024-01-10 RX ORDER — BUPIVACAINE HYDROCHLORIDE 2.5 MG/ML
INJECTION, SOLUTION INFILTRATION; PERINEURAL PRN
Status: DISCONTINUED | OUTPATIENT
Start: 2024-01-10 | End: 2024-01-10 | Stop reason: HOSPADM

## 2024-01-10 RX ORDER — NALOXONE HYDROCHLORIDE 0.4 MG/ML
0.2 INJECTION, SOLUTION INTRAMUSCULAR; INTRAVENOUS; SUBCUTANEOUS
Status: DISCONTINUED | OUTPATIENT
Start: 2024-01-10 | End: 2024-01-10 | Stop reason: HOSPADM

## 2024-01-10 RX ORDER — FENTANYL CITRATE 50 UG/ML
50 INJECTION, SOLUTION INTRAMUSCULAR; INTRAVENOUS EVERY 5 MIN PRN
Status: DISCONTINUED | OUTPATIENT
Start: 2024-01-10 | End: 2024-01-10 | Stop reason: HOSPADM

## 2024-01-10 RX ORDER — FENTANYL CITRATE 50 UG/ML
25 INJECTION, SOLUTION INTRAMUSCULAR; INTRAVENOUS EVERY 5 MIN PRN
Status: DISCONTINUED | OUTPATIENT
Start: 2024-01-10 | End: 2024-01-10 | Stop reason: HOSPADM

## 2024-01-10 RX ORDER — FENTANYL CITRATE 50 UG/ML
INJECTION, SOLUTION INTRAMUSCULAR; INTRAVENOUS PRN
Status: DISCONTINUED | OUTPATIENT
Start: 2024-01-10 | End: 2024-01-10

## 2024-01-10 RX ORDER — AMOXICILLIN 250 MG
1-2 CAPSULE ORAL 2 TIMES DAILY
Qty: 30 TABLET | Refills: 0 | Status: SHIPPED | OUTPATIENT
Start: 2024-01-10 | End: 2024-02-29

## 2024-01-10 RX ORDER — KETOROLAC TROMETHAMINE 30 MG/ML
INJECTION, SOLUTION INTRAMUSCULAR; INTRAVENOUS PRN
Status: DISCONTINUED | OUTPATIENT
Start: 2024-01-10 | End: 2024-01-10

## 2024-01-10 RX ORDER — ACETAMINOPHEN 325 MG/1
650 TABLET ORAL EVERY 6 HOURS PRN
Qty: 16 TABLET | Refills: 0 | Status: SHIPPED | OUTPATIENT
Start: 2024-01-10 | End: 2024-01-24

## 2024-01-10 RX ORDER — LIDOCAINE HYDROCHLORIDE 20 MG/ML
INJECTION, SOLUTION INFILTRATION; PERINEURAL PRN
Status: DISCONTINUED | OUTPATIENT
Start: 2024-01-10 | End: 2024-01-10

## 2024-01-10 RX ORDER — ONDANSETRON 2 MG/ML
INJECTION INTRAMUSCULAR; INTRAVENOUS PRN
Status: DISCONTINUED | OUTPATIENT
Start: 2024-01-10 | End: 2024-01-10

## 2024-01-10 RX ORDER — HYDROMORPHONE HCL IN WATER/PF 6 MG/30 ML
0.4 PATIENT CONTROLLED ANALGESIA SYRINGE INTRAVENOUS EVERY 5 MIN PRN
Status: DISCONTINUED | OUTPATIENT
Start: 2024-01-10 | End: 2024-01-10 | Stop reason: HOSPADM

## 2024-01-10 RX ADMIN — PROPOFOL 30 MG: 10 INJECTION, EMULSION INTRAVENOUS at 07:52

## 2024-01-10 RX ADMIN — LIDOCAINE HYDROCHLORIDE 40 MG: 20 INJECTION, SOLUTION INFILTRATION; PERINEURAL at 07:35

## 2024-01-10 RX ADMIN — FENTANYL CITRATE 50 MCG: 50 INJECTION INTRAMUSCULAR; INTRAVENOUS at 07:53

## 2024-01-10 RX ADMIN — PROPOFOL 80 MG: 10 INJECTION, EMULSION INTRAVENOUS at 07:35

## 2024-01-10 RX ADMIN — PROPOFOL 140 MCG/KG/MIN: 10 INJECTION, EMULSION INTRAVENOUS at 07:35

## 2024-01-10 RX ADMIN — PROPOFOL 40 MG: 10 INJECTION, EMULSION INTRAVENOUS at 07:47

## 2024-01-10 RX ADMIN — MIDAZOLAM HYDROCHLORIDE 2 MG: 1 INJECTION, SOLUTION INTRAMUSCULAR; INTRAVENOUS at 07:33

## 2024-01-10 RX ADMIN — KETOROLAC TROMETHAMINE 30 MG: 30 INJECTION, SOLUTION INTRAMUSCULAR at 08:00

## 2024-01-10 RX ADMIN — PROPOFOL 50 MG: 10 INJECTION, EMULSION INTRAVENOUS at 07:42

## 2024-01-10 RX ADMIN — ONDANSETRON HYDROCHLORIDE 4 MG: 2 SOLUTION INTRAMUSCULAR; INTRAVENOUS at 07:51

## 2024-01-10 RX ADMIN — SODIUM CHLORIDE, SODIUM LACTATE, POTASSIUM CHLORIDE, AND CALCIUM CHLORIDE 100 ML/HR: 600; 310; 30; 20 INJECTION, SOLUTION INTRAVENOUS at 07:04

## 2024-01-10 RX ADMIN — DEXAMETHASONE SODIUM PHOSPHATE 8 MG: 4 INJECTION, SOLUTION INTRA-ARTICULAR; INTRALESIONAL; INTRAMUSCULAR; INTRAVENOUS; SOFT TISSUE at 07:51

## 2024-01-10 RX ADMIN — PROPOFOL 30 MG: 10 INJECTION, EMULSION INTRAVENOUS at 07:38

## 2024-01-10 ASSESSMENT — ACTIVITIES OF DAILY LIVING (ADL)
ADLS_ACUITY_SCORE: 35
ADLS_ACUITY_SCORE: 35

## 2024-01-10 NOTE — ANESTHESIA PREPROCEDURE EVALUATION
Anesthesia Pre-Procedure Evaluation    Patient: Lilliana Viera   MRN: [id number] : 2005        Procedure : Procedure(s):  HYSTEROSCOPY, DIAGNOSTIC, WITH DILATION AND CURETTAGE OF UTERUS and Insert Intrauterine Device          Past Medical History:   Diagnosis Date    Closed fracture of phalanx of finger         Migraine without status migrainosus, not intractable     No Comments Provided      Past Surgical History:   Procedure Laterality Date    ARTHROSCOPIC RECONSTRUCTION ANTERIOR CRUCIATE LIGAMENT BONE TENDON BONE AUTOGRAFT Left 02/15/2019    Procedure: Examination Under Anesthesia Left Knee, Left Anterior Cruciate Ligament Reconstruction, Bone Tendon Bone Autograft;  Surgeon: Himanshu Cortez MD;  Location: [facility] OR      No Known Allergies   Social History     Tobacco Use    Smoking status: Never     Passive exposure: Never    Smokeless tobacco: Never   Substance Use Topics    Alcohol use: No     Alcohol/week: 0.0 standard drinks of alcohol      Wt Readings from Last 1 Encounters:   01/10/24 54.4 kg (120 lb)        Anesthesia Evaluation   Pt has had prior anesthetic. Type: General and MAC.    No history of anesthetic complications       ROS/MED HX  ENT/Pulmonary:     (+)                     Intermittent, asthma  Treatment: Inhaler prn,                 Neurologic:  - neg neurologic ROS     Cardiovascular:       METS/Exercise Tolerance: >4 METS    Hematologic:  - neg hematologic  ROS     Musculoskeletal:  - neg musculoskeletal ROS     GI/Hepatic:  - neg GI/hepatic ROS     Renal/Genitourinary:  - neg Renal ROS     Endo:  - neg endo ROS     Psychiatric/Substance Use:  - neg psychiatric ROS     Infectious Disease:  - neg infectious disease ROS     Malignancy:  - neg malignancy ROS     Other:  - neg other ROS          Physical Exam    Airway        Mallampati: I   TM distance: > 3 FB   Neck ROM: full   Mouth opening: > 3 cm    Respiratory Devices and Support         Dental       (+) Minor  "Abnormalities - some fillings, tiny chips      Cardiovascular   cardiovascular exam normal       Rhythm and rate: regular and normal     Pulmonary   pulmonary exam normal        breath sounds clear to auscultation           OUTSIDE LABS:  CBC:   Lab Results   Component Value Date    WBC 9.3 12/18/2023    WBC 7.9 03/08/2023    HGB 15.3 12/18/2023    HGB 14.5 03/08/2023    HCT 43.8 12/18/2023    HCT 41.2 03/08/2023     12/18/2023     03/08/2023     BMP:   Lab Results   Component Value Date     03/08/2023     03/01/2020    POTASSIUM 4.1 03/08/2023    POTASSIUM 4.2 03/01/2020    CHLORIDE 106 03/08/2023    CHLORIDE 106 03/01/2020    CO2 30 (H) 03/08/2023    CO2 23 03/01/2020    BUN 12.3 03/08/2023    BUN 15 03/01/2020    CR 0.78 03/08/2023    CR 1.06 03/01/2020    GLC 87 03/08/2023     (H) 03/01/2020     COAGS: No results found for: \"PTT\", \"INR\", \"FIBR\"  POC:   Lab Results   Component Value Date    HCG Negative 01/10/2024     HEPATIC:   Lab Results   Component Value Date    ALBUMIN 4.8 03/08/2023    PROTTOTAL 7.6 03/08/2023    ALT 18 03/08/2023    AST 14 03/08/2023    ALKPHOS 66 03/08/2023    BILITOTAL 0.3 03/08/2023     OTHER:   Lab Results   Component Value Date    DEMARCO 9.9 03/08/2023       Anesthesia Plan    ASA Status:  2    NPO Status:  NPO Appropriate    Anesthesia Type: MAC (Proceed to general as needed.).     - Reason for MAC: straight local not clinically adequate   Induction: Intravenous.           Consents    Anesthesia Plan(s) and associated risks, benefits, and realistic alternatives discussed. Questions answered and patient/representative(s) expressed understanding.     - Discussed: Risks, Benefits and Alternatives for BOTH SEDATION and the PROCEDURE were discussed     - Discussed with:  Patient       - Patient is DNR/DNI Status: No          Postoperative Care    Pain management: IV analgesics, Multi-modal analgesia.   PONV prophylaxis: Ondansetron (or other 5HT-3), " Dexamethasone or Solumedrol     Comments:               AUNG WAGGONER CRNA    I have reviewed the pertinent notes and labs in the chart from the past 30 days and (re)examined the patient.  Any updates or changes from those notes are reflected in this note.

## 2024-01-10 NOTE — ANESTHESIA CARE TRANSFER NOTE
Patient: Lilliana Viera    Procedure: Procedure(s):  HYSTEROSCOPY, DIAGNOSTIC, WITH DILATION AND CURETTAGE OF UTERUS and Insert Intrauterine Device       Diagnosis: Endometrial polyp [N84.0]  Diagnosis Additional Information: No value filed.    Anesthesia Type:   No value filed.     Note:    Oropharynx: oropharynx clear of all foreign objects and spontaneously breathing  Level of Consciousness: drowsy  Oxygen Supplementation: nasal cannula  Level of Supplemental Oxygen (L/min / FiO2): 3  Independent Airway: airway patency satisfactory and stable  Dentition: dentition unchanged  Vital Signs Stable: post-procedure vital signs reviewed and stable  Report to RN Given: handoff report given  Patient transferred to: Phase II    Handoff Report: Identifed the Patient, Identified the Reponsible Provider, Reviewed the pertinent medical history, Discussed the surgical course, Reviewed Intra-OP anesthesia mangement and issues during anesthesia, Set expectations for post-procedure period and Allowed opportunity for questions and acknowledgement of understanding      Vitals:  Vitals Value Taken Time   BP     Temp     Pulse     Resp     SpO2 98 % 01/10/24 0811   Vitals shown include unfiled device data.    Electronically Signed By: Mirian Jonas  January 10, 2024  8:12 AM

## 2024-01-10 NOTE — ANESTHESIA POSTPROCEDURE EVALUATION
Patient: Lilliana Viera    Procedure: Procedure(s):  HYSTEROSCOPY, DIAGNOSTIC, WITH DILATION AND CURETTAGE OF UTERUS and Insert Intrauterine Device       Anesthesia Type:  No value filed.    Note:  Disposition: Outpatient   Postop Pain Control: Uneventful            Sign Out: Well controlled pain   PONV: No   Neuro/Psych: Uneventful            Sign Out: Acceptable/Baseline neuro status   Airway/Respiratory: Uneventful            Sign Out: Acceptable/Baseline resp. status   CV/Hemodynamics: Uneventful            Sign Out: Acceptable CV status; No obvious hypovolemia; No obvious fluid overload   Other NRE: NONE   DID A NON-ROUTINE EVENT OCCUR? No       Last vitals:  Vitals Value Taken Time   /65 01/10/24 0900   Temp 97.5  F (36.4  C) 01/10/24 0810   Pulse 79 01/10/24 0900   Resp     SpO2 99 % 01/10/24 0851   Vitals shown include unfiled device data.    Electronically Signed By: AUNG Ugalde CRNA  January 10, 2024  3:31 PM

## 2024-01-10 NOTE — OP NOTE
[facility]  Operative Note    Patient: Lilliana Viera  : 2005  MRN: [id number]    Date of Service: 1/10/2024    Pre-operative diagnosis:  1. Abnormal uterine bleeding  2. Suspected endometrial polyp  3. IUD in place    Post-operative diagnosis:  1. Endometrial polyps  2. Replacement of Mirena IUD    Procedure:   1. IUD removal  2. Hysteroscopy D&C with endometrial polypectomy  3. IUD insertion    Surgeon: Clari Ruiz MD    Anesthesia: Local with MAC    EBL: 20 mL  Urine: N/A  Hysteroscopic Deficit: 370 mL    Specimens:   Endometrial curettings  Complications: none    Findings: EUA revealed normal cervix and anteverted uterus, no adnexal masses.  IUD strings noted at the external cervical os. Uterine cavity had multiple small endometrial polyps.    Indications: Lilliana Viera is a 18 year old female who has history of abnormal uterine bleeding despite IUD in place. US shows suspected endometrial polyp. Risks, benefits, and alternatives to the procedure were discussed. The patient's questions were answered, understanding confirmed, and the patient signed written informed consent.    Technique:   The patient was taken to the operating room where she was placed in the dorsal lithotomy position with feet in yellow fin stirrups. The patient was placed under MAC without complications. An exam under anesthesia was performed to reveal an anteverted uterus. The patient was prepped and draped in the usual sterile fashion. A speculum was placed in the vagina and the cervix visualized. The IUD strings were noted at the external os and gently removed. The IUD was noted to be intact and discarded. A single-toothed tenaculum was placed on the anterior lip of the cervix at 12 o'clock. 10cc of 0.25% Marcaine local anesthetic was placed for a paracervical block. The cervical os was carefully dilated to 6 mm with sequential dilators. The MyoSure operating hysteroscope introducer was placed through  the cervix into the uterine cavity. Examination of the uterine cavity demonstrated multiple small endometrial polypoid structures across the fundus and uterine body. The remainder of the cavity was unremarkable. Bilateral tubal ostea were normal in appearance. Pictures were taken. The reciprocating hysteroscopic morcellator for the Myosure device was used to remove the endometrial polyps with good success. Pictures were taken. The hysteroscope apparatus was removed and total of 370 mL fluid deficit of normal saline noted. The new Mirena IUD was then placed with the applicator without any difficulty. The IUD strings were cut a few centimeters beyond the external cervical os. The tenaculum was removed from the cervix and good hemostasis was noted after application of silver nitrate. The speculum was removed from the vagina.    Instrument counts were correct x2. The patient was awoken in the OR and transferred to the PACU in stable condition.    Mirena IUD  Exp date: Dec 2025  Lot # [id number]    Clari Ruiz MD

## 2024-01-10 NOTE — INTERVAL H&P NOTE
I have reviewed the surgical (or preoperative) H&P that is linked to this encounter, and examined the patient. There are no significant changes. She has had a UTI during this visit that was shown to be negative and treated 12/30. Recent vaginal candida which was also treated on 12/30 and no residual symptoms    Clari Ruiz MD on 1/10/2024 at 7:17 AM      Clinical Conditions Present on Arrival:  Clinically Significant Risk Factors Present on Admission

## 2024-01-12 LAB
PATH REPORT.COMMENTS IMP SPEC: NORMAL
PATH REPORT.FINAL DX SPEC: NORMAL
PATH REPORT.RELEVANT HX SPEC: NORMAL
PHOTO IMAGE: NORMAL

## 2024-01-23 ASSESSMENT — PATIENT HEALTH QUESTIONNAIRE - PHQ9
10. IF YOU CHECKED OFF ANY PROBLEMS, HOW DIFFICULT HAVE THESE PROBLEMS MADE IT FOR YOU TO DO YOUR WORK, TAKE CARE OF THINGS AT HOME, OR GET ALONG WITH OTHER PEOPLE: SOMEWHAT DIFFICULT
SUM OF ALL RESPONSES TO PHQ QUESTIONS 1-9: 7
SUM OF ALL RESPONSES TO PHQ QUESTIONS 1-9: 7

## 2024-01-24 ENCOUNTER — OFFICE VISIT (OUTPATIENT)
Dept: OBGYN | Facility: OTHER | Age: 19
End: 2024-01-24
Attending: STUDENT IN AN ORGANIZED HEALTH CARE EDUCATION/TRAINING PROGRAM
Payer: COMMERCIAL

## 2024-01-24 VITALS
BODY MASS INDEX: 24.82 KG/M2 | HEART RATE: 68 BPM | TEMPERATURE: 99 F | WEIGHT: 122.9 LBS | DIASTOLIC BLOOD PRESSURE: 64 MMHG | SYSTOLIC BLOOD PRESSURE: 98 MMHG

## 2024-01-24 DIAGNOSIS — Z98.890 STATUS POST HYSTEROSCOPIC POLYPECTOMY: ICD-10-CM

## 2024-01-24 DIAGNOSIS — N89.8 VAGINAL ITCHING: Primary | ICD-10-CM

## 2024-01-24 DIAGNOSIS — N93.9 ABNORMAL UTERINE BLEEDING (AUB): ICD-10-CM

## 2024-01-24 LAB
ALBUMIN UR-MCNC: NEGATIVE MG/DL
APPEARANCE UR: CLEAR
BACTERIAL VAGINOSIS VAG-IMP: NEGATIVE
BILIRUB UR QL STRIP: NEGATIVE
CANDIDA DNA VAG QL NAA+PROBE: NOT DETECTED
CANDIDA GLABRATA / CANDIDA KRUSEI DNA: NOT DETECTED
COLOR UR AUTO: ABNORMAL
GLUCOSE UR STRIP-MCNC: NEGATIVE MG/DL
HGB UR QL STRIP: NEGATIVE
KETONES UR STRIP-MCNC: NEGATIVE MG/DL
LEUKOCYTE ESTERASE UR QL STRIP: NEGATIVE
MUCOUS THREADS #/AREA URNS LPF: PRESENT /LPF
NITRATE UR QL: NEGATIVE
PH UR STRIP: 5.5 [PH] (ref 5–9)
RBC URINE: <1 /HPF
SP GR UR STRIP: 1.02 (ref 1–1.03)
T VAGINALIS DNA VAG QL NAA+PROBE: NOT DETECTED
UROBILINOGEN UR STRIP-MCNC: NORMAL MG/DL
WBC URINE: 1 /HPF

## 2024-01-24 PROCEDURE — 81003 URINALYSIS AUTO W/O SCOPE: CPT | Mod: ZL | Performed by: STUDENT IN AN ORGANIZED HEALTH CARE EDUCATION/TRAINING PROGRAM

## 2024-01-24 PROCEDURE — 0352U MULTIPLEX VAGINAL PANEL BY PCR: CPT | Mod: ZL | Performed by: STUDENT IN AN ORGANIZED HEALTH CARE EDUCATION/TRAINING PROGRAM

## 2024-01-24 PROCEDURE — 99212 OFFICE O/P EST SF 10 MIN: CPT | Performed by: STUDENT IN AN ORGANIZED HEALTH CARE EDUCATION/TRAINING PROGRAM

## 2024-01-24 NOTE — PROGRESS NOTES
Postoperative Visit  2024    S: Lilliana Viera is a 18 year old  here for post-operative visit following hysteroscopy D&C with endometrial polypectomy on 1/10/2024. She reports feeling well.  Her bleeding has significantly improved after the surgery. She has liked the IUD thus far and denies any painful cramping. She notes some mild vaginal itching and occasional dysuria- wanting to ensure no sign of vaginal or bladder infection. No fevers, chills or change in discharge.     O:   BP 98/64   Pulse 68   Temp 99  F (37.2  C) (Oral)   Wt 55.7 kg (122 lb 14.4 oz)   LMP 2023 (Exact Date)   BMI 24.82 kg/m    Gen:  Well-appearing, NAD  Abd: soft, non-tender  Pelvic: normal external genitalia, normal vaginal mucosa with no discharge. Cervix is back to closed with strings at the external os. No CMT or uterine tenderness.    Pathology:   Benign endometrial polyps, inactive endometrium with progestin effect    A/P:   Ms. Lilliana Viera is a 18 year old  with history of AUB related to endometrial polyps s/p hysteroscopy D&C with endometrial polypectomy and IUD insertion. Doing well.  - MVP and UA collected today  - Clinically no sign of infection  - IUD strings noted at the external os    Answers submitted by the patient for this visit:  Patient Health Questionnaire (Submitted on 2024)  If you checked off any problems, how difficult have these problems made it for you to do your work, take care of things at home, or get along with other people?: Somewhat difficult  PHQ9 TOTAL SCORE: 7  General Questionnaire (Submitted on 2024)  Chief Complaint: Chronic problems general questions HPI Form  What is the reason for your visit today? : Post-Op  How many servings of fruits and vegetables do you eat daily?: 0-1  On average, how many sweetened beverages do you drink each day (Examples: soda, juice, sweet tea, etc.  Do NOT count diet or artificially sweetened beverages)?: 1  How many minutes a day  do you exercise enough to make your heart beat faster?: 10 to 19  How many days a week do you exercise enough to make your heart beat faster?: 3 or less  How many days per week do you miss taking your medication?: 1  What makes it hard for you to take your medication every day?: remembering to take    Total amount of time spent during today's encounter including chart prep, face to face, exam and documentation was 10 minutes  Clari Ruiz MD on 1/24/2024 at 3:06 PM

## 2024-02-28 ENCOUNTER — E-VISIT (OUTPATIENT)
Dept: FAMILY MEDICINE | Facility: OTHER | Age: 19
End: 2024-02-28
Payer: COMMERCIAL

## 2024-02-28 DIAGNOSIS — F41.9 ANXIETY: ICD-10-CM

## 2024-02-28 DIAGNOSIS — F32.2 MAJOR DEPRESSIVE DISORDER, SINGLE EPISODE, SEVERE WITHOUT PSYCHOTIC FEATURES (H): Primary | ICD-10-CM

## 2024-02-28 PROCEDURE — 99421 OL DIG E/M SVC 5-10 MIN: CPT | Performed by: PHYSICIAN ASSISTANT

## 2024-02-28 ASSESSMENT — PATIENT HEALTH QUESTIONNAIRE - PHQ9
10. IF YOU CHECKED OFF ANY PROBLEMS, HOW DIFFICULT HAVE THESE PROBLEMS MADE IT FOR YOU TO DO YOUR WORK, TAKE CARE OF THINGS AT HOME, OR GET ALONG WITH OTHER PEOPLE: EXTREMELY DIFFICULT
SUM OF ALL RESPONSES TO PHQ QUESTIONS 1-9: 16

## 2024-02-28 ASSESSMENT — ANXIETY QUESTIONNAIRES
GAD7 TOTAL SCORE: 16
7. FEELING AFRAID AS IF SOMETHING AWFUL MIGHT HAPPEN: SEVERAL DAYS
8. IF YOU CHECKED OFF ANY PROBLEMS, HOW DIFFICULT HAVE THESE MADE IT FOR YOU TO DO YOUR WORK, TAKE CARE OF THINGS AT HOME, OR GET ALONG WITH OTHER PEOPLE?: EXTREMELY DIFFICULT
GAD7 TOTAL SCORE: 16

## 2024-02-29 ENCOUNTER — E-VISIT (OUTPATIENT)
Dept: FAMILY MEDICINE | Facility: OTHER | Age: 19
End: 2024-02-29
Payer: COMMERCIAL

## 2024-02-29 ENCOUNTER — NURSE TRIAGE (OUTPATIENT)
Dept: FAMILY MEDICINE | Facility: OTHER | Age: 19
End: 2024-02-29

## 2024-02-29 DIAGNOSIS — F41.9 ANXIETY: ICD-10-CM

## 2024-02-29 DIAGNOSIS — F32.2 MAJOR DEPRESSIVE DISORDER, SINGLE EPISODE, SEVERE WITHOUT PSYCHOTIC FEATURES (H): Primary | ICD-10-CM

## 2024-02-29 ASSESSMENT — ANXIETY QUESTIONNAIRES
4. TROUBLE RELAXING: NEARLY EVERY DAY
8. IF YOU CHECKED OFF ANY PROBLEMS, HOW DIFFICULT HAVE THESE MADE IT FOR YOU TO DO YOUR WORK, TAKE CARE OF THINGS AT HOME, OR GET ALONG WITH OTHER PEOPLE?: EXTREMELY DIFFICULT
GAD7 TOTAL SCORE: 16
5. BEING SO RESTLESS THAT IT IS HARD TO SIT STILL: SEVERAL DAYS
IF YOU CHECKED OFF ANY PROBLEMS ON THIS QUESTIONNAIRE, HOW DIFFICULT HAVE THESE PROBLEMS MADE IT FOR YOU TO DO YOUR WORK, TAKE CARE OF THINGS AT HOME, OR GET ALONG WITH OTHER PEOPLE: EXTREMELY DIFFICULT
GAD7 TOTAL SCORE: 16
7. FEELING AFRAID AS IF SOMETHING AWFUL MIGHT HAPPEN: SEVERAL DAYS
GAD7 TOTAL SCORE: 16
GAD7 TOTAL SCORE: 16
6. BECOMING EASILY ANNOYED OR IRRITABLE: MORE THAN HALF THE DAYS
2. NOT BEING ABLE TO STOP OR CONTROL WORRYING: NEARLY EVERY DAY
1. FEELING NERVOUS, ANXIOUS, OR ON EDGE: NEARLY EVERY DAY
7. FEELING AFRAID AS IF SOMETHING AWFUL MIGHT HAPPEN: SEVERAL DAYS
3. WORRYING TOO MUCH ABOUT DIFFERENT THINGS: NEARLY EVERY DAY

## 2024-02-29 ASSESSMENT — PATIENT HEALTH QUESTIONNAIRE - PHQ9
SUM OF ALL RESPONSES TO PHQ QUESTIONS 1-9: 16
SUM OF ALL RESPONSES TO PHQ QUESTIONS 1-9: 16
10. IF YOU CHECKED OFF ANY PROBLEMS, HOW DIFFICULT HAVE THESE PROBLEMS MADE IT FOR YOU TO DO YOUR WORK, TAKE CARE OF THINGS AT HOME, OR GET ALONG WITH OTHER PEOPLE: EXTREMELY DIFFICULT
10. IF YOU CHECKED OFF ANY PROBLEMS, HOW DIFFICULT HAVE THESE PROBLEMS MADE IT FOR YOU TO DO YOUR WORK, TAKE CARE OF THINGS AT HOME, OR GET ALONG WITH OTHER PEOPLE: EXTREMELY DIFFICULT
SUM OF ALL RESPONSES TO PHQ QUESTIONS 1-9: 16
SUM OF ALL RESPONSES TO PHQ QUESTIONS 1-9: 16

## 2024-02-29 NOTE — PATIENT INSTRUCTIONS
Suicide Emergency First call for help:  [phone]          Counselors:     [facility]: [phone]    Cathie Josias: [phone]    Cheryl Katz: [phone]    Clinton Ferrara CNP, [facility]: [phone]    Andie Chawla: [phone]    [facility]: [phone]    Leonel Enoc: [phone]    [phone]    [facility]: [phone]    Rhina Santos: [phone]    [facility]: [phone]    Adolescent Counseling:   [facility]: [phone]    [facility]: [phone]    [facility]: [phone]      Depression and Chronic Disease: Care Instructions  Overview     A chronic disease is one that you have for a long time. Some chronic diseases can be managed, but they usually cannot be cured. Depression is common in people with chronic diseases.  If you have depression, it's not your fault. Depression is a common mental health condition. And it may get better with treatment. Medicines, counseling, and self-care can all help.  Follow-up care is a key part of your treatment and safety. Be sure to make and go to all appointments, and call your doctor if you are having problems. It's also a good idea to know your test results and keep a list of the medicines you take.  How can you care for yourself at home?  Watch for symptoms of depression  The symptoms of depression are often subtle at first. You may think they are caused by your disease rather than depression. Or you may think it is normal to be depressed when you have a chronic disease.  If you are depressed you may:  Feel sad or hopeless.  Feel guilty or worthless.  Not enjoy the things you used to enjoy.  Feel hopeless, as though life is not worth living.  Have trouble thinking or remembering.  Have low energy, and you may not eat or sleep well.  Pull away from  others.  Think often about death or killing yourself.  Get treatment  By treating your depression, you can feel more hopeful and have more energy. If you feel better, you may take better care of yourself, so your health may improve.  Talk to your doctor if you have any changes in mood during treatment for your disease.  Ask your doctor for help. Counseling, antidepressant medicine, or a combination of the two can help most people with depression. Often a combination works best. Counseling can also help you cope with having a chronic disease.  Where to get help 24 hours a day, 7 days a week   If you or someone you know talks about suicide, self-harm, a mental health crisis, a substance use crisis, or any other kind of emotional distress, get help right away. You can:  Call the Suicide and Crisis Lifeline at 281.  Call 5-438-055-TALK (1-381.649.5442).  Text HOME to 996629 to access the Crisis Text Line.  Consider saving these numbers in your phone.  Go to [employer] for more information or to chat online.  When should you call for help?   Call 141 anytime you think you may need emergency care. For example, call if:    You feel like hurting yourself or someone else.     Someone you know has depression and is about to attempt or is attempting suicide.   Where to get help 24 hours a day, 7 days a week   If you or someone you know talks about suicide, self-harm, a mental health crisis, a substance use crisis, or any other kind of emotional distress, get help right away. You can:    Call the Suicide and Crisis Lifeline at 053.     Call 3-094-919-TALK (1-119.985.2451).     Text HOME to 395231 to access the Crisis Text Line.   Consider saving these numbers in your phone.  Go to [employer] for more information or to chat online.  Call your doctor now or seek immediate medical care if:    You hear voices.     Someone you know has depression and:  Starts to give away possessions.  Uses illegal drugs or drinks alcohol  "heavily.  Talks or writes about death, including writing suicide notes or talking about guns, knives, or pills.  Starts to spend a lot of time alone.  Acts very aggressively or suddenly appears calm.   Watch closely for changes in your health, and be sure to contact your doctor if:    You do not get better as expected.   Where can you learn more?  Go to https://www.[employer].net/patiented  Enter A548 in the search box to learn more about \"Depression and Chronic Disease: Care Instructions.\"  Current as of: June 25, 2023               Content Version: 13.8    8032-9577 [employer].   Care instructions adapted under license by your healthcare professional. If you have questions about a medical condition or this instruction, always ask your healthcare professional. [employer] disclaims any warranty or liability for your use of this information.                   My Depression Action Plan  Name: Lilliana Viera   Date of Birth 2005  Date: 2/29/2024    My doctor: Domenic Toure   My clinic: [facility]  [address]  [facility] [address]  [phone]            GREEN    ZONE   Good Control    What it looks like:   Things are going generally well. You have normal ups and downs. You may even feel depressed from time to time, but bad moods usually last less than a day.   What you need to do:  Continue to care for yourself (see self care plan)  Check your depression survival kit and update it as needed  Follow your physician s recommendations including any medication.  Do not stop taking medication unless you consult with your physician first.             YELLOW         ZONE Getting Worse    What it looks like:   Depression is starting to interfere with your life.   It may be hard to get out of bed; you may be starting to isolate yourself from others.  Symptoms of depression are starting to last most all day and this has happened for several days.   You may " have suicidal thoughts but they are not constant.   What you need to do:     Call your care team. Your response to treatment will improve if you keep your care team informed of your progress. Yellow periods are signs an adjustment may need to be made.     Continue your self-care.  Just get dressed and ready for the day.  Don't give yourself time to talk yourself out of it.    Talk to someone in your support network.    Open up your Depression Self-Care Plan/Wellness Kit.             RED    ZONE Medical Alert - Get Help    What it looks like:   Depression is seriously interfering with your life.   You may experience these or other symptoms: You can t get out of bed most days, can t work or engage in other necessary activities, you have trouble taking care of basic hygiene, or basic responsibilities, thoughts of suicide or death that will not go away, self-injurious behavior.     What you need to do:  Call your care team and request a same-day appointment. If they are not available (weekends or after hours) call your local crisis line, emergency room or 911.          Depression Self-Care Plan / Wellness Kit    Many people find that medication and therapy are helpful treatments for managing depression. In addition, making small changes to your everyday life can help to boost your mood and improve your wellbeing. Below are some tips for you to consider. Be sure to talk with your medical provider and/or behavioral health consultant if your symptoms are worsening or not improving.     Sleep   Sleep hygiene  means all of the habits that support good, restful sleep. It includes maintaining a consistent bedtime and wake time, using your bedroom only for sleeping or sex, and keeping the bedroom dark and free of distractions like a computer, smartphone, or television.     Develop a Healthy Routine  Maintain good hygiene. Get out of bed in the morning, make your bed, brush your teeth, take a shower, and get dressed. Don t spend  too much time viewing media that makes you feel stressed. Find time to relax each day.    Exercise  Get some form of exercise every day. This will help reduce pain and release endorphins, the  feel good  chemicals in your brain. It can be as simple as just going for a walk or doing some gardening, anything that will get you moving.      Diet  Strive to eat healthy foods, including fruits and vegetables. Drink plenty of water. Avoid excessive sugar, caffeine, alcohol, and other mood-altering substances.     Stay Connected with Others  Stay in touch with friends and family members.    Manage Your Mood  Try deep breathing, massage therapy, biofeedback, or meditation. Take part in fun activities when you can. Try to find something to smile about each day.     Psychotherapy  Be open to working with a therapist if your provider recommends it.     Medication  Be sure to take your medication as prescribed. Most anti-depressants need to be taken every day. It usually takes several weeks for medications to work. Not all medicines work for all people. It is important to follow-up with your provider to make sure you have a treatment plan that is working for you. Do not stop your medication abruptly without first discussing it with your provider.    Crisis Resources   These hotlines are for both adults and children. They and are open 24 hours a day, 7 days a week unless noted otherwise.    National Suicide Prevention Lifeline   988 or 1-469-642-LWTU (6165)    Crisis Text Line    www.crisistextline.org  Text HOME to 187812 from anywhere in the [place], anytime, about any type of crisis. A live, trained crisis counselor will receive the text and respond quickly.    [place] Lifeline for LGBTQ Youth  A national crisis intervention and suicide lifeline for LGBTQ youth under 25. Provides a safe place to talk without judgement. Call 1-829.377.6427; text START to 374401 or visit www.thetrevorproject.org to talk to a trained  counselor.    For [facility] crisis numbers, visit the [facility] website at:  https://mn.[facility]/people-we-serve/adults/health-care/mental-health/resources/crisis-contacts.jsp    Learning About Anxiety Disorders  What are anxiety disorders?     Anxiety disorders are a type of medical problem. They cause severe anxiety. When you feel anxious, you feel that something bad is about to happen. This feeling interferes with your life.  These disorders include:  Generalized anxiety disorder. You feel worried and stressed about many everyday events and activities. This goes on for several months and disrupts your life on most days.  Panic disorder. You have repeated panic attacks. A panic attack is a sudden, intense fear or anxiety. It may make you feel short of breath. Your heart may pound.  Social anxiety disorder. You feel very anxious about what you will say or do in front of people. For example, you may be scared to talk or eat in public. This problem affects your daily life.  Phobias. You are very scared of a specific object, situation, or activity. For example, you may fear spiders, high places, or small spaces.  What are the symptoms?  Generalized anxiety disorder  Symptoms may include:  Feeling worried and stressed about many things almost every day.  Feeling tired or irritable. You may have a hard time concentrating.  Having headaches or muscle aches.  Having a hard time getting to sleep or staying asleep.  Panic disorder  You may have repeated panic attacks when there is no reason for feeling afraid. You may change your daily activities because you worry that you will have another attack.  Symptoms may include:  Intense fear, terror, or anxiety.  Trouble breathing or very fast breathing.  Chest pain or tightness.  A heartbeat that races or is not regular.  Social anxiety disorder  Symptoms may include:  Fear about a social situation, such as eating in front of others or speaking in public. You may worry a lot. Or  "you may be afraid that something bad will happen.  Anxiety that can cause you to blush, sweat, and feel shaky.  A heartbeat that is faster than normal.  A hard time focusing.  Phobias  Symptoms may include:  More fear than most people of being around an object, being in a situation, or doing an activity. You might also be stressed about the chance of being around the thing you fear.  Worry about losing control, panicking, fainting, or having physical symptoms like a faster heartbeat when you are around the situation or object.  How are these disorders treated?  Anxiety disorders can be treated with medicines or counseling. A combination of both may be used.  Medicines may include:  Antidepressants. These may help your symptoms by keeping chemicals in your brain in balance.  Benzodiazepines. These may give you short-term relief of your symptoms.  Some people use cognitive-behavioral therapy. A therapist helps you learn to change stressful or bad thoughts into helpful thoughts.  Lead a healthy lifestyle  A healthy lifestyle may help you feel better.  Get at least 30 minutes of exercise on most days of the week. Walking is a good choice.  Eat a healthy diet. Include fruits, vegetables, lean proteins, and whole grains in your diet each day.  Try to go to bed at the same time every night. Try for 8 hours of sleep a night.  Find ways to manage stress. Try relaxation exercises.  Avoid alcohol and illegal drugs.  Follow-up care is a key part of your treatment and safety. Be sure to make and go to all appointments, and call your doctor if you are having problems. It's also a good idea to know your test results and keep a list of the medicines you take.  Where can you learn more?  Go to https://www.[employer].net/patiented  Enter K667 in the search box to learn more about \"Learning About Anxiety Disorders.\"  Current as of: June 25, 2023               Content Version: 13.8    4868-5369 [employer] [facility].   Care " instructions adapted under license by your healthcare professional. If you have questions about a medical condition or this instruction, always ask your healthcare professional. [employer] disclaims any warranty or liability for your use of this information.

## 2024-02-29 NOTE — TELEPHONE ENCOUNTER
"S-(situation): Patient states she is feeling like her depression is worsening    B-(background): Patient has history of depressive episodes, does not have a therapist but would like to have a referral and discuss medications    A-(assessment): depressive episode, no thoughts of self harm currently    R-(recommendations): Will route to provider for e-visit    Judy Zavaleta RN on 2/29/2024 at 8:30 AM        Reason for Disposition   Caller wants a referral to a mental health specialist    Additional Information   Negative: Acts or talks confused   Negative: Sounds like a life-threatening emergency to the triager   Negative: Suicidal threats, plans, thoughts or attempts are the main concern   Negative: Homicidal behavior is the main concern   Negative: Substance abuse suspected with symptoms now   Negative: Mental health hospital admission needed in the past and depression symptoms are similar   Negative: Patient is extremely upset (e.g. can't be calmed down)   Negative: Depression getting worse or sounds severe to triager (patient is withdrawn and has dropped out of several normal activities, sleeping poorly, less able to do activities of daily living)   Negative: Child sounds very sick or weak to the triager   Negative: Self-harm (such as cutting) BUT no thoughts or threats of suicide    Answer Assessment - Initial Assessment Questions  1. CONCERN: \"What happened that made you call today?\" \"What is your main question or concern?\"      Depression- \"downward spiral\"  2. RISK OF HARM - SUICIDAL ATTEMPT or THOUGHTS: \"Have you ever tried to hurt yourself?\" If yes, \"When was that?\"  \"Do you ever have thoughts of hurting yourself?\"       no  3. ONSET: \"When did the sadness or depression begin?\"      month  4. EVENTS AND STRESSORS: \"Has there been any recent changes, new stressors, pressures, or upsetting events in your life?\" (e.g., recent loss of loved one, etc)      no  5. FUNCTIONAL IMPAIRMENT: \"How have things been " "going at home and at school (or work)? (same, better, or worse). \"Are your sad feelings keeping you from doing any of your normal daily activities?\" (such as school, work, friendships, teams, clubs)      worse  6. RECURRENT SYMPTOMS: \"Have you ever been this sad or depressed before?\" If so, ask: \"When was the last time?\" and \"What happened that time?\"      yes  7. THERAPIST: \"Do you have a counselor or therapist? Name?\"      no  8. PARENT QUESTION - TEEN'S APPEARANCE: \"How does your teen look?\" \"What are they doing right now?\"      *No Answer*    Note to Triager: It's better to speak to the older child or teen directly for these calls.    Protocols used: Depression-P-OH    "

## 2024-03-01 ENCOUNTER — OFFICE VISIT (OUTPATIENT)
Dept: PSYCHIATRY | Facility: OTHER | Age: 19
End: 2024-03-01
Payer: COMMERCIAL

## 2024-03-01 VITALS
SYSTOLIC BLOOD PRESSURE: 111 MMHG | RESPIRATION RATE: 16 BRPM | WEIGHT: 122.1 LBS | TEMPERATURE: 98.7 F | HEART RATE: 77 BPM | BODY MASS INDEX: 24.61 KG/M2 | DIASTOLIC BLOOD PRESSURE: 73 MMHG | HEIGHT: 59 IN | OXYGEN SATURATION: 97 %

## 2024-03-01 DIAGNOSIS — F41.1 GENERALIZED ANXIETY DISORDER: Primary | ICD-10-CM

## 2024-03-01 DIAGNOSIS — F32.2 MAJOR DEPRESSIVE DISORDER, SINGLE EPISODE, SEVERE WITHOUT PSYCHOTIC FEATURES (H): ICD-10-CM

## 2024-03-01 PROCEDURE — 99215 OFFICE O/P EST HI 40 MIN: CPT

## 2024-03-01 PROCEDURE — 99417 PROLNG OP E/M EACH 15 MIN: CPT

## 2024-03-01 PROCEDURE — G2211 COMPLEX E/M VISIT ADD ON: HCPCS

## 2024-03-01 RX ORDER — HYDROXYZINE HYDROCHLORIDE 25 MG/1
25 TABLET, FILM COATED ORAL 2 TIMES DAILY PRN
Qty: 60 TABLET | Refills: 1 | Status: SHIPPED | OUTPATIENT
Start: 2024-03-01 | End: 2024-04-03

## 2024-03-01 ASSESSMENT — PAIN SCALES - GENERAL: PAINLEVEL: NO PAIN (0)

## 2024-03-01 ASSESSMENT — PATIENT HEALTH QUESTIONNAIRE - PHQ9: SUM OF ALL RESPONSES TO PHQ QUESTIONS 1-9: 16
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
## 2024-03-01 NOTE — NURSING NOTE
"Chief Complaint   Patient presents with    New Patient       Initial There were no vitals taken for this visit. Estimated body mass index is 24.82 kg/m  as calculated from the following:    Height as of 1/10/24: 1.499 m (4' 11\").    Weight as of 1/24/24: 55.7 kg (122 lb 14.4 oz).  Medication Review: complete    The next two questions are to help us understand your food security.  If you are feeling you need any assistance in this area, we have resources available to support you today.          12/17/2023   SDOH- Food Insecurity   Within the past 12 months, did you worry that your food would run out before you got money to buy more? N   Within the past 12 months, did the food you bought just not last and you didn t have money to get more? N         Health Care Directive:  Patient does not have a Health Care Directive or Living Will: Discussed advance care planning with patient; however, patient declined at this time.    Emily Fonseca CNA      "

## 2024-03-01 NOTE — PATIENT INSTRUCTIONS
"Lilliana, it was a pleasure seeing you today. As we discussed:    Taper lexapro - take 10 mg in the AM for 3 days, then stop.  START sertraline - take 25 mg at bedtime for 1 week, then increase to 50 mg daily at bedtime.  START hydroxyzine 25 mg twice daily as needed for anxiety - I encourage you to first take this medication at bedtime to see how you do on it and if it helps with sleep/anxiety.    I encourage you to establish with individual therapy - below are a list of options in the area.    Call the clinic with medication questions or concerns at [phone] or send a [employer] message. [employer] may be used to communicate with your provider, but this is not intended to be used for emergencies.     Mobile Crisis Line for [facility]: 211 (First Call for Help)    Therapy Options in [place] and Surrounding Area:    Ren Mahoney, [facility] - ([phone])    [facility] Services - Ramiro Cormier ([phone])    Andie Vaughan, [facility], family and individual therapy- ([phone])    [facility] - specializes in women and adolescent therapy - ([phone])    [facility] - EMDR, trauma, etc. ([phone])    [facility] - spiritual based support groups ([phone])    Tee Briceno - adults, adolescents and children ([phone])    [facility] - several different therapy options adults and children ([phone])    [facility] - several options, one of the largest mental health providers in the area - ([phone])    [facility] - ([phone])    [facility] - offers several therapy options including 8-week \"express\" therapy program - ([phone])    [facility] - ([phone])    [facility] - adult therapy ([phone])    [facility] - ([phone])    [facility] - ([phone])    [facility] - ([phone])    [facility], " Ericka  ([phone])

## 2024-04-01 ENCOUNTER — OFFICE VISIT (OUTPATIENT)
Dept: PSYCHIATRY | Facility: OTHER | Age: 19
End: 2024-04-01
Payer: COMMERCIAL

## 2024-04-01 VITALS
BODY MASS INDEX: 25.66 KG/M2 | RESPIRATION RATE: 16 BRPM | WEIGHT: 127.3 LBS | HEART RATE: 95 BPM | SYSTOLIC BLOOD PRESSURE: 106 MMHG | DIASTOLIC BLOOD PRESSURE: 67 MMHG | TEMPERATURE: 98.3 F | HEIGHT: 59 IN | OXYGEN SATURATION: 97 %

## 2024-04-01 DIAGNOSIS — F32.2 MAJOR DEPRESSIVE DISORDER, SINGLE EPISODE, SEVERE WITHOUT PSYCHOTIC FEATURES (H): ICD-10-CM

## 2024-04-01 DIAGNOSIS — F41.1 GENERALIZED ANXIETY DISORDER: ICD-10-CM

## 2024-04-01 PROCEDURE — 99213 OFFICE O/P EST LOW 20 MIN: CPT

## 2024-04-01 PROCEDURE — G2211 COMPLEX E/M VISIT ADD ON: HCPCS

## 2024-04-01 ASSESSMENT — PATIENT HEALTH QUESTIONNAIRE - PHQ9
SUM OF ALL RESPONSES TO PHQ QUESTIONS 1-9: 10
SUM OF ALL RESPONSES TO PHQ QUESTIONS 1-9: 10
10. IF YOU CHECKED OFF ANY PROBLEMS, HOW DIFFICULT HAVE THESE PROBLEMS MADE IT FOR YOU TO DO YOUR WORK, TAKE CARE OF THINGS AT HOME, OR GET ALONG WITH OTHER PEOPLE: SOMEWHAT DIFFICULT

## 2024-04-01 ASSESSMENT — ANXIETY QUESTIONNAIRES
IF YOU CHECKED OFF ANY PROBLEMS ON THIS QUESTIONNAIRE, HOW DIFFICULT HAVE THESE PROBLEMS MADE IT FOR YOU TO DO YOUR WORK, TAKE CARE OF THINGS AT HOME, OR GET ALONG WITH OTHER PEOPLE: SOMEWHAT DIFFICULT
7. FEELING AFRAID AS IF SOMETHING AWFUL MIGHT HAPPEN: SEVERAL DAYS
5. BEING SO RESTLESS THAT IT IS HARD TO SIT STILL: SEVERAL DAYS
3. WORRYING TOO MUCH ABOUT DIFFERENT THINGS: MORE THAN HALF THE DAYS
GAD7 TOTAL SCORE: 11
2. NOT BEING ABLE TO STOP OR CONTROL WORRYING: MORE THAN HALF THE DAYS
6. BECOMING EASILY ANNOYED OR IRRITABLE: SEVERAL DAYS
7. FEELING AFRAID AS IF SOMETHING AWFUL MIGHT HAPPEN: SEVERAL DAYS
GAD7 TOTAL SCORE: 11
GAD7 TOTAL SCORE: 11
8. IF YOU CHECKED OFF ANY PROBLEMS, HOW DIFFICULT HAVE THESE MADE IT FOR YOU TO DO YOUR WORK, TAKE CARE OF THINGS AT HOME, OR GET ALONG WITH OTHER PEOPLE?: SOMEWHAT DIFFICULT
4. TROUBLE RELAXING: MORE THAN HALF THE DAYS
1. FEELING NERVOUS, ANXIOUS, OR ON EDGE: MORE THAN HALF THE DAYS

## 2024-04-01 ASSESSMENT — PAIN SCALES - GENERAL: PAINLEVEL: NO PAIN (0)

## 2024-04-01 NOTE — PROGRESS NOTES
"[facility] AND [facility] PSYCHIATRY   PROGRESS NOTE       HISTORY OF PRESENT ILLNESS     Lilliana Viera is a 18 year old year old female with anxiety and depression, who presents for ongoing psychiatric care.  Lilliana Viera was last seen in clinic on 3/1/2024.  At that time:  She reports an increase in both anxiety and depression symptoms over the last few months-she did have somewhat of an improvement in her suicidal ideation/depression since beginning Lexapro, however is continuing to struggle with daily anxiety consistent with generalized anxiety disorder.  Differentials include posttraumatic stress disorder, adjustment disorder.     To better target her anxiety and depression symptoms and given her trauma history, I discussed a trial of sertraline in place of escitalopram to see if this is more beneficial for her symptoms.  In addition to this, I discussed a trial of as needed hydroxyzine for anxiety and for sleep.  She verbalized understanding of this and was agreeable to the plan.     Taper Lexapro-take 10 mg daily in the morning for 3 days then stop.  Start sertraline-take 25 mg daily at bedtime for 1 week, then increase to 50 mg daily at bedtime for depression/anxiety.  Start hydroxyzine 25 mg twice daily as needed for anxiety-discussed taking this medicine at bedtime for the first few doses to assess for sedation.    Today:    Lilliana presents alone for ongoing medication management and psychiatric care. She reports things are going \"great\" since our last visit - she has been tolerating sertraline without side effects and has her appetite back, is \"enjoying the little things again\", and getting along better with friends and family. She also established therapy with [facility] and had her first visit last week. Denies suicidal ideation, denies thoughts of self harm.     Target symptoms: rumination thoughts, excessive worries, intrusive thoughts, sleep, self-worth, SIB, passive SI " "    Symptoms improved: anxiety and depression symptoms (including those listed above) significantly improved     Sleep: improved     Past Med Trials:  Escitalopram  trazodone     REVIEW OF SYSTEMS   The review of systems is negative other than noted in the HPI. Reports some mild bruising (shows me a picture which appears to be mild petechia) to her upper leg and wonders if this is from her medication. Denies headache, chest pain, shortness of breath.     Past Medical History:   Diagnosis Date    Closed fracture of phalanx of finger     2015    Migraine without status migrainosus, not intractable     No Comments Provided      Current Outpatient Medications   Medication Instructions    albuterol (PROAIR HFA/PROVENTIL HFA/VENTOLIN HFA) 108 (90 Base) MCG/ACT inhaler 2 puffs, Inhalation, EVERY 4 HOURS PRN    hydrOXYzine HCl (ATARAX) 25 mg, Oral, 2 TIMES DAILY PRN    sertraline (ZOLOFT) 50 MG tablet Take 25 mg (1/2 tablet) for one week, then increase to 50 mg (full tablet).         MENTAL STATUS EXAM    Vitals: /67 (BP Location: Left arm, Patient Position: Sitting, Cuff Size: Adult Regular)   Pulse 95   Temp 98.3  F (36.8  C) (Tympanic)   Resp 16   Ht 1.499 m (4' 11\")   Wt 57.7 kg (127 lb 4.8 oz)   SpO2 97%   BMI 25.71 kg/m      Wt Readings from Last 4 Encounters:   04/01/24 57.7 kg (127 lb 4.8 oz)   03/01/24 55.4 kg (122 lb 1.6 oz)   01/24/24 55.7 kg (122 lb 14.4 oz)   01/10/24 54.4 kg (120 lb)      Appearance:  No apparent distress, Casually dressed, and Well groomed  Behavior/relationship to examiner/demeanor:  Cooperative, Engaged, and Pleasant  Motor activity/EPS:  Normal  Mood (subjective report):  euthymic and \"great\"  Affect (objective appearance):  Appropriate/mood-congruent, Euthymic, and Bright  Thought content:  no evidence of suicidal or homicidal ideation, no overt psychosis, and denies suicidal ideation, intent or thoughts  Insight:  Good  Judgment:  Good, Adequate for safety, and Appropriate " for age        2/29/2024    12:18 PM 2/29/2024     3:58 PM 4/1/2024     3:14 PM   PHQ   PHQ-9 Total Score 16 16 10   Q9: Thoughts of better off dead/self-harm past 2 weeks Several days Several days Several days   F/U: Thoughts of suicide or self-harm Yes Yes No   F/U: Self harm-plan Yes Yes    F/U: Self-harm action Yes Yes    F/U: Safety concerns No No No         2/28/2024     8:41 PM 2/29/2024     3:58 PM 4/1/2024     3:15 PM   SONIDO-7 SCORE   Total Score 16 (severe anxiety) 16 (severe anxiety) 11 (moderate anxiety)   Total Score 16 16 11        LABS     NA     ASSESSMENT     This is a 18 year old female with a PMH of anxiety and depression who presents for ongoing psychiatric care and medication management. Significant improvement in symptoms since starting sertraline - has only used hydroxyzine once or twice with benefit. Continue with current dosing - no medication changes made today. Uncertain whether petechia on legs is from sertraline - did discuss with Lilliana that selective serotonin reuptake inhibitor (selective serotonin reuptake inhibitor) medications do have risk of increased bleeding/bruising, although typically presents as mild. I encouraged her to reach out to myself or follow-up with primary care if petechia/bruising worsens.        DIAGNOSIS & PLAN   The longitudinal plan of care for the diagnosis(es)/condition(s) as documented were addressed during this visit. Due to the added complexity in care, I will continue to support Lilliana in the subsequent management and with ongoing continuity of care.       ICD-10-CM    1. Major depressive disorder, single episode, severe without psychotic features (H)  F32.2 sertraline (ZOLOFT) 50 MG tablet    r/o PTSD      2. Generalized anxiety disorder  F41.1 sertraline (ZOLOFT) 50 MG tablet    with anxiety attacks          Medication:   Continue sertraline 50 mg daily for anxiety/depression.  Continue hydroxyzine 25 mg as needed for anxiety attacks.     Psychotherapy:  Continue individual therapy at [facility].   Labs: None ordered.   F/U: 3 months or sooner if symptoms occur.     The risks, benefits, alternatives and side effects have been discussed and are understood by the patient. The patient understands the risks of using street drugs or alcohol. The patient understands to call 911, 211 ([facility] Crisis Line) or come to the nearest ED if life threatening or urgent symptoms present.     25 minutes spent on the date of the encounter doing chart review, history and exam, documentation and further activities per the note.     ATTESTATION      Ramona Cote, DARNELL, PMHNP-BC

## 2024-04-01 NOTE — NURSING NOTE
"Chief Complaint   Patient presents with    Recheck Medication       Initial There were no vitals taken for this visit. Estimated body mass index is 24.66 kg/m  as calculated from the following:    Height as of 3/1/24: 1.499 m (4' 11\").    Weight as of 3/1/24: 55.4 kg (122 lb 1.6 oz).  Medication Review: complete    The next two questions are to help us understand your food security.  If you are feeling you need any assistance in this area, we have resources available to support you today.          3/1/2024   SDOH- Food Insecurity   Within the past 12 months, did you worry that your food would run out before you got money to buy more? N   Within the past 12 months, did the food you bought just not last and you didn t have money to get more? N         Health Care Directive:  Patient does not have a Health Care Directive or Living Will: Discussed advance care planning with patient; however, patient declined at this time.    Emily Fonseca CNA      " no

## 2024-04-02 DIAGNOSIS — F41.1 GENERALIZED ANXIETY DISORDER: ICD-10-CM

## 2024-04-03 RX ORDER — HYDROXYZINE HYDROCHLORIDE 25 MG/1
25 TABLET, FILM COATED ORAL 2 TIMES DAILY
Qty: 60 TABLET | Refills: 1 | Status: SHIPPED | OUTPATIENT
Start: 2024-04-03

## 2024-04-03 NOTE — TELEPHONE ENCOUNTER
[id number] sent Rx request for the following:      Requested Prescriptions   Pending Prescriptions Disp Refills    hydrOXYzine HCl (ATARAX) 25 MG tablet [Pharmacy Med Name: hydrOXYzine HCl 25 MG Oral Tablet] 60 tablet 0     Sig: TAKE 1 TABLET BY MOUTH TWICE DAILY AS NEEDED FOR ITCHING     Last Prescription Date:   3/1/24  Last Fill Qty/Refills:         60, R-1    Last Office Visit:              4/1/24   Future Office visit:           7/1/24    Per LOV note:    Return in about 3 months (around 7/1/2024) for Follow up, with me.    Unable to complete prescription refill per RN Medication Refill Policy.     Gerri Blackmon RN .............. 4/3/2024  9:22 AM

## 2024-04-08 ENCOUNTER — E-VISIT (OUTPATIENT)
Dept: FAMILY MEDICINE | Facility: OTHER | Age: 19
End: 2024-04-08
Payer: COMMERCIAL

## 2024-04-08 DIAGNOSIS — N39.0 ACUTE UTI (URINARY TRACT INFECTION): Primary | ICD-10-CM

## 2024-04-08 PROCEDURE — 99421 OL DIG E/M SVC 5-10 MIN: CPT | Performed by: PHYSICIAN ASSISTANT

## 2024-04-09 PROBLEM — M54.81 OCCIPITAL NEURALGIA OF LEFT SIDE: Status: RESOLVED | Noted: 2022-03-09 | Resolved: 2024-04-09

## 2024-04-09 PROBLEM — G43.109 MIGRAINE WITH AURA AND WITHOUT STATUS MIGRAINOSUS, NOT INTRACTABLE: Status: RESOLVED | Noted: 2022-03-09 | Resolved: 2024-04-09

## 2024-04-09 RX ORDER — NITROFURANTOIN 25; 75 MG/1; MG/1
100 CAPSULE ORAL 2 TIMES DAILY
Qty: 10 CAPSULE | Refills: 0 | Status: SHIPPED | OUTPATIENT
Start: 2024-04-09 | End: 2024-04-14

## 2024-04-09 NOTE — PATIENT INSTRUCTIONS
Dear Lilliana Viera    After reviewing your responses, I've been able to diagnose you with a urinary tract infection, which is a common infection of the bladder with bacteria.  This is not a sexually transmitted infection, though urinating immediately after intercourse can help prevent infections.  Drinking lots of fluids is also helpful to clear your current infection and prevent the next one.      I have sent a prescription for antibiotics to your pharmacy to treat this infection.    It is important that you take all of your prescribed medication even if your symptoms are improving after a few doses.  Taking all of your medicine helps prevent the symptoms from returning.     If your symptoms worsen, you develop pain in your back or stomach, develop fevers, or are not improving in 5 days, please contact your primary care provider for an appointment or visit any of our convenient Walk-in or Urgent Care Centers to be seen, which can be found on our website here.    Thanks again for choosing us as your health care partner,    Sherrell Powell PA-C  Urinary Tract Infection (UTI) in Women: Care Instructions  Overview     A urinary tract infection (UTI) is an infection caused by bacteria. It can happen anywhere in the urinary tract. A UTI can happen in the:  Kidneys.  Ureters, the tubes that connect the kidneys to the bladder.  Bladder.  Urethra, where the urine comes out.  Most UTIs are bladder infections. They often cause pain or burning when you urinate.  Most UTIs can be cured with antibiotics. If you are prescribed antibiotics, be sure to complete your treatment so that the infection does not get worse.  Follow-up care is a key part of your treatment and safety. Be sure to make and go to all appointments, and call your doctor if you are having problems. It's also a good idea to know your test results and keep a list of the medicines you take.  How can you care for yourself at home?  Take your antibiotics as directed.  "Do not stop taking them just because you feel better. You need to take the full course of antibiotics.  Drink extra water and other fluids for the next day or two. This will help make the urine less concentrated and help wash out the bacteria that are causing the infection. (If you have kidney, heart, or liver disease and have to limit fluids, talk with your doctor before you increase the amount of fluids you drink.)  Avoid drinks that are carbonated or have caffeine. They can irritate the bladder.  Urinate often. Try to empty your bladder each time.  To relieve pain, take a hot bath or lay a heating pad set on low over your lower belly or genital area. Never go to sleep with a heating pad in place.  To prevent UTIs  Drink plenty of water each day. This helps you urinate often, which clears bacteria from your system. (If you have kidney, heart, or liver disease and have to limit fluids, talk with your doctor before you increase the amount of fluids you drink.)  Urinate when you need to.  If you are sexually active, urinate right after you have sex.  Change sanitary pads often.  Avoid douches, bubble baths, feminine hygiene sprays, and other feminine hygiene products that have deodorants.  After going to the bathroom, wipe from front to back.  When should you call for help?   Call your doctor now or seek immediate medical care if:    You have new or worse fever, chills, nausea, or vomiting.     You have new pain in your back just below your rib cage. This is called flank pain.     There is new blood or pus in your urine.     You have any problems with your antibiotic medicine.   Watch closely for changes in your health, and be sure to contact your doctor if:    You are not getting better after taking an antibiotic for 2 days.     Your symptoms go away but then come back.   Where can you learn more?  Go to https://www.healthwise.net/patiented  Enter K848 in the search box to learn more about \"Urinary Tract Infection " "(UTI) in Women: Care Instructions.\"  Current as of: November 15, 2023               Content Version: 14.0    8531-6720 [employer].   Care instructions adapted under license by your healthcare professional. If you have questions about a medical condition or this instruction, always ask your healthcare professional. [employer] disclaims any warranty or liability for your use of this information.      "

## 2024-04-24 ENCOUNTER — E-VISIT (OUTPATIENT)
Dept: FAMILY MEDICINE | Facility: OTHER | Age: 19
End: 2024-04-24
Payer: COMMERCIAL

## 2024-04-24 DIAGNOSIS — N39.0 ACUTE UTI (URINARY TRACT INFECTION): Primary | ICD-10-CM

## 2024-04-24 PROCEDURE — 99421 OL DIG E/M SVC 5-10 MIN: CPT | Performed by: PHYSICIAN ASSISTANT

## 2024-04-24 RX ORDER — NITROFURANTOIN 25; 75 MG/1; MG/1
100 CAPSULE ORAL 2 TIMES DAILY
Qty: 10 CAPSULE | Refills: 0 | Status: SHIPPED | OUTPATIENT
Start: 2024-04-24 | End: 2024-04-29

## 2024-04-24 NOTE — PATIENT INSTRUCTIONS
Dear Lilliana Viera    After reviewing your responses, I've been able to diagnose you with a urinary tract infection, which is a common infection of the bladder with bacteria.  This is not a sexually transmitted infection, though urinating immediately after intercourse can help prevent infections.  Drinking lots of fluids is also helpful to clear your current infection and prevent the next one.      I have sent a prescription for antibiotics to your pharmacy to treat this infection.    It is important that you take all of your prescribed medication even if your symptoms are improving after a few doses.  Taking all of your medicine helps prevent the symptoms from returning.     If your symptoms worsen, you develop pain in your back or stomach, develop fevers, or are not improving in 5 days, please contact your primary care provider for an appointment or visit any of our convenient Walk-in or Urgent Care Centers to be seen, which can be found on our website here.    Thanks again for choosing us as your health care partner,    Sherrell Powell PA-C  Urinary Tract Infection (UTI) in Women: Care Instructions  Overview     A urinary tract infection (UTI) is an infection caused by bacteria. It can happen anywhere in the urinary tract. A UTI can happen in the:  Kidneys.  Ureters, the tubes that connect the kidneys to the bladder.  Bladder.  Urethra, where the urine comes out.  Most UTIs are bladder infections. They often cause pain or burning when you urinate.  Most UTIs can be cured with antibiotics. If you are prescribed antibiotics, be sure to complete your treatment so that the infection does not get worse.  Follow-up care is a key part of your treatment and safety. Be sure to make and go to all appointments, and call your doctor if you are having problems. It's also a good idea to know your test results and keep a list of the medicines you take.  How can you care for yourself at home?  Take your antibiotics as directed.  "Do not stop taking them just because you feel better. You need to take the full course of antibiotics.  Drink extra water and other fluids for the next day or two. This will help make the urine less concentrated and help wash out the bacteria that are causing the infection. (If you have kidney, heart, or liver disease and have to limit fluids, talk with your doctor before you increase the amount of fluids you drink.)  Avoid drinks that are carbonated or have caffeine. They can irritate the bladder.  Urinate often. Try to empty your bladder each time.  To relieve pain, take a hot bath or lay a heating pad set on low over your lower belly or genital area. Never go to sleep with a heating pad in place.  To prevent UTIs  Drink plenty of water each day. This helps you urinate often, which clears bacteria from your system. (If you have kidney, heart, or liver disease and have to limit fluids, talk with your doctor before you increase the amount of fluids you drink.)  Urinate when you need to.  If you are sexually active, urinate right after you have sex.  Change sanitary pads often.  Avoid douches, bubble baths, feminine hygiene sprays, and other feminine hygiene products that have deodorants.  After going to the bathroom, wipe from front to back.  When should you call for help?   Call your doctor now or seek immediate medical care if:    You have new or worse fever, chills, nausea, or vomiting.     You have new pain in your back just below your rib cage. This is called flank pain.     There is new blood or pus in your urine.     You have any problems with your antibiotic medicine.   Watch closely for changes in your health, and be sure to contact your doctor if:    You are not getting better after taking an antibiotic for 2 days.     Your symptoms go away but then come back.   Where can you learn more?  Go to https://www.healthwise.net/patiented  Enter K848 in the search box to learn more about \"Urinary Tract Infection " "(UTI) in Women: Care Instructions.\"  Current as of: November 15, 2023               Content Version: 14.0    8687-0503 [employer].   Care instructions adapted under license by your healthcare professional. If you have questions about a medical condition or this instruction, always ask your healthcare professional. [employer] disclaims any warranty or liability for your use of this information.      "

## 2024-06-30 ENCOUNTER — E-VISIT (OUTPATIENT)
Dept: URGENT CARE | Facility: CLINIC | Age: 19
End: 2024-06-30
Payer: COMMERCIAL

## 2024-06-30 DIAGNOSIS — N39.0 ACUTE UTI (URINARY TRACT INFECTION): Primary | ICD-10-CM

## 2024-06-30 PROCEDURE — 99421 OL DIG E/M SVC 5-10 MIN: CPT | Performed by: PHYSICIAN ASSISTANT

## 2024-06-30 RX ORDER — NITROFURANTOIN 25; 75 MG/1; MG/1
100 CAPSULE ORAL 2 TIMES DAILY
Qty: 10 CAPSULE | Refills: 0 | Status: SHIPPED | OUTPATIENT
Start: 2024-06-30 | End: 2024-07-05

## 2024-07-01 NOTE — PATIENT INSTRUCTIONS
Dear Lilliana Viera    After reviewing your responses, I've been able to diagnose you with a urinary tract infection, which is a common infection of the bladder with bacteria.  This is not a sexually transmitted infection, though urinating immediately after intercourse can help prevent infections.  Drinking lots of fluids is also helpful to clear your current infection and prevent the next one.      I have sent a prescription for antibiotics to your pharmacy to treat this infection.    It is important that you take all of your prescribed medication even if your symptoms are improving after a few doses.  Taking all of your medicine helps prevent the symptoms from returning.     If your symptoms worsen, you develop pain in your back or stomach, develop fevers, or are not improving in 5 days, please contact your primary care provider for an appointment or visit any of our convenient Walk-in or Urgent Care Centers to be seen, which can be found on our website here.    Thanks again for choosing us as your health care partner,    Gabby Dougherty PA-C  Urinary Tract Infection (UTI) in Women: Care Instructions  Overview     A urinary tract infection (UTI) is an infection caused by bacteria. It can happen anywhere in the urinary tract. A UTI can happen in the:  Kidneys.  Ureters, the tubes that connect the kidneys to the bladder.  Bladder.  Urethra, where the urine comes out.  Most UTIs are bladder infections. They often cause pain or burning when you urinate.  Most UTIs can be cured with antibiotics. If you are prescribed antibiotics, be sure to complete your treatment so that the infection does not get worse.  Follow-up care is a key part of your treatment and safety. Be sure to make and go to all appointments, and call your doctor if you are having problems. It's also a good idea to know your test results and keep a list of the medicines you take.  How can you care for yourself at home?  Take your antibiotics as directed.  "Do not stop taking them just because you feel better. You need to take the full course of antibiotics.  Drink extra water and other fluids for the next day or two. This will help make the urine less concentrated and help wash out the bacteria that are causing the infection. (If you have kidney, heart, or liver disease and have to limit fluids, talk with your doctor before you increase the amount of fluids you drink.)  Avoid drinks that are carbonated or have caffeine. They can irritate the bladder.  Urinate often. Try to empty your bladder each time.  To relieve pain, take a hot bath or lay a heating pad set on low over your lower belly or genital area. Never go to sleep with a heating pad in place.  To prevent UTIs  Drink plenty of water each day. This helps you urinate often, which clears bacteria from your system. (If you have kidney, heart, or liver disease and have to limit fluids, talk with your doctor before you increase the amount of fluids you drink.)  Urinate when you need to.  If you are sexually active, urinate right after you have sex.  Change sanitary pads often.  Avoid douches, bubble baths, feminine hygiene sprays, and other feminine hygiene products that have deodorants.  After going to the bathroom, wipe from front to back.  When should you call for help?   Call your doctor now or seek immediate medical care if:    You have new or worse fever, chills, nausea, or vomiting.     You have new pain in your back just below your rib cage. This is called flank pain.     There is new blood or pus in your urine.     You have any problems with your antibiotic medicine.   Watch closely for changes in your health, and be sure to contact your doctor if:    You are not getting better after taking an antibiotic for 2 days.     Your symptoms go away but then come back.   Where can you learn more?  Go to https://www.healthwise.net/patiented  Enter K848 in the search box to learn more about \"Urinary Tract Infection " "(UTI) in Women: Care Instructions.\"  Current as of: November 15, 2023               Content Version: 14.0    1055-8962 [employer].   Care instructions adapted under license by your healthcare professional. If you have questions about a medical condition or this instruction, always ask your healthcare professional. [employer] disclaims any warranty or liability for your use of this information.      "

## 2024-07-08 ENCOUNTER — E-VISIT (OUTPATIENT)
Dept: FAMILY MEDICINE | Facility: OTHER | Age: 19
End: 2024-07-08
Payer: COMMERCIAL

## 2024-07-08 DIAGNOSIS — R30.0 DYSURIA: Primary | ICD-10-CM

## 2024-07-09 NOTE — PATIENT INSTRUCTIONS
Dear Lilliana Viera,     After reviewing your responses, I would like you to come in for a urine test to make sure we treat you correctly. This urine test is to evaluate you for a possible urinary tract infection, and should be scheduled for today or tomorrow. Schedule a Lab Only appointment here.     Lab appointments are not available at most locations on the weekends. If no Lab Only appointment is available, you should be seen in any of our convenient Walk-in or Urgent Care Centers, which can be found on our website here.     You will receive instructions with your results in [employer] once they are available.     If your symptoms worsen, you develop pain in your back or stomach, develop fevers, or are not improving in 5 days, please contact your primary care provider for an appointment or visit a Walk-in or Urgent Care Center to be seen.     Thanks again for choosing us as your health care partner,     If your urine does not reflect a urinary tract infection, it will be recommended that you schedule an appointment or go to the [facility] (walk in clinic).     Mirta Chowdary NP

## 2024-07-10 ENCOUNTER — OFFICE VISIT (OUTPATIENT)
Dept: FAMILY MEDICINE | Facility: OTHER | Age: 19
End: 2024-07-10
Payer: COMMERCIAL

## 2024-07-10 VITALS
OXYGEN SATURATION: 98 % | DIASTOLIC BLOOD PRESSURE: 63 MMHG | TEMPERATURE: 98.7 F | RESPIRATION RATE: 16 BRPM | HEART RATE: 66 BPM | HEIGHT: 59 IN | SYSTOLIC BLOOD PRESSURE: 99 MMHG | BODY MASS INDEX: 25.1 KG/M2 | WEIGHT: 124.5 LBS

## 2024-07-10 DIAGNOSIS — R10.9 ABDOMINAL PAIN IN FEMALE: ICD-10-CM

## 2024-07-10 DIAGNOSIS — R35.0 URINARY FREQUENCY: ICD-10-CM

## 2024-07-10 DIAGNOSIS — R39.15 URINARY URGENCY: ICD-10-CM

## 2024-07-10 DIAGNOSIS — R10.9 RIGHT FLANK PAIN: Primary | ICD-10-CM

## 2024-07-10 DIAGNOSIS — Z87.442 HISTORY OF KIDNEY STONES: ICD-10-CM

## 2024-07-10 DIAGNOSIS — F32.2 MAJOR DEPRESSIVE DISORDER, SINGLE EPISODE, SEVERE WITHOUT PSYCHOTIC FEATURES (H): ICD-10-CM

## 2024-07-10 DIAGNOSIS — N39.9 URINARY PROBLEM IN FEMALE: ICD-10-CM

## 2024-07-10 LAB
ALBUMIN UR-MCNC: NEGATIVE MG/DL
AMORPH CRY #/AREA URNS HPF: ABNORMAL /HPF
ANION GAP SERPL CALCULATED.3IONS-SCNC: 9 MMOL/L (ref 7–15)
APPEARANCE UR: ABNORMAL
BASOPHILS # BLD AUTO: 0.1 10E3/UL (ref 0–0.2)
BASOPHILS NFR BLD AUTO: 1 %
BILIRUB UR QL STRIP: NEGATIVE
BUN SERPL-MCNC: 15.5 MG/DL (ref 6–20)
CALCIUM SERPL-MCNC: 9.8 MG/DL (ref 8.6–10)
CHLORIDE SERPL-SCNC: 105 MMOL/L (ref 98–107)
COLOR UR AUTO: ABNORMAL
CREAT SERPL-MCNC: 0.81 MG/DL (ref 0.51–0.95)
DEPRECATED HCO3 PLAS-SCNC: 25 MMOL/L (ref 22–29)
EGFRCR SERPLBLD CKD-EPI 2021: >90 ML/MIN/1.73M2
EOSINOPHIL # BLD AUTO: 0.2 10E3/UL (ref 0–0.7)
EOSINOPHIL NFR BLD AUTO: 2 %
ERYTHROCYTE [DISTWIDTH] IN BLOOD BY AUTOMATED COUNT: 12.2 % (ref 10–15)
GLUCOSE SERPL-MCNC: 92 MG/DL (ref 70–99)
GLUCOSE UR STRIP-MCNC: NEGATIVE MG/DL
HCT VFR BLD AUTO: 41 % (ref 35–47)
HGB BLD-MCNC: 14 G/DL (ref 11.7–15.7)
HGB UR QL STRIP: NEGATIVE
IMM GRANULOCYTES # BLD: 0 10E3/UL
IMM GRANULOCYTES NFR BLD: 0 %
KETONES UR STRIP-MCNC: NEGATIVE MG/DL
LEUKOCYTE ESTERASE UR QL STRIP: NEGATIVE
LYMPHOCYTES # BLD AUTO: 2.2 10E3/UL (ref 0.8–5.3)
LYMPHOCYTES NFR BLD AUTO: 31 %
MCH RBC QN AUTO: 31.2 PG (ref 26.5–33)
MCHC RBC AUTO-ENTMCNC: 34.1 G/DL (ref 31.5–36.5)
MCV RBC AUTO: 91 FL (ref 78–100)
MONOCYTES # BLD AUTO: 0.6 10E3/UL (ref 0–1.3)
MONOCYTES NFR BLD AUTO: 8 %
MUCOUS THREADS #/AREA URNS LPF: PRESENT /LPF
NEUTROPHILS # BLD AUTO: 4.1 10E3/UL (ref 1.6–8.3)
NEUTROPHILS NFR BLD AUTO: 58 %
NITRATE UR QL: NEGATIVE
NRBC # BLD AUTO: 0 10E3/UL
NRBC BLD AUTO-RTO: 0 /100
PH UR STRIP: 7 [PH] (ref 5–9)
PLATELET # BLD AUTO: 302 10E3/UL (ref 150–450)
POTASSIUM SERPL-SCNC: 4 MMOL/L (ref 3.4–5.3)
RBC # BLD AUTO: 4.49 10E6/UL (ref 3.8–5.2)
RBC URINE: <1 /HPF
SODIUM SERPL-SCNC: 139 MMOL/L (ref 135–145)
SP GR UR STRIP: 1.02 (ref 1–1.03)
UROBILINOGEN UR STRIP-MCNC: NORMAL MG/DL
WBC # BLD AUTO: 7.1 10E3/UL (ref 4–11)
WBC URINE: 2 /HPF

## 2024-07-10 PROCEDURE — 81001 URINALYSIS AUTO W/SCOPE: CPT | Mod: ZL

## 2024-07-10 PROCEDURE — 250N000011 HC RX IP 250 OP 636: Mod: JZ

## 2024-07-10 PROCEDURE — 96372 THER/PROPH/DIAG INJ SC/IM: CPT

## 2024-07-10 PROCEDURE — 99214 OFFICE O/P EST MOD 30 MIN: CPT

## 2024-07-10 PROCEDURE — 80048 BASIC METABOLIC PNL TOTAL CA: CPT | Mod: ZL

## 2024-07-10 PROCEDURE — 85025 COMPLETE CBC W/AUTO DIFF WBC: CPT | Mod: ZL

## 2024-07-10 PROCEDURE — 36415 COLL VENOUS BLD VENIPUNCTURE: CPT | Mod: ZL

## 2024-07-10 RX ORDER — KETOROLAC TROMETHAMINE 10 MG/1
10 TABLET, FILM COATED ORAL EVERY 6 HOURS PRN
Qty: 20 TABLET | Refills: 0 | Status: SHIPPED | OUTPATIENT
Start: 2024-07-10

## 2024-07-10 RX ORDER — KETOROLAC TROMETHAMINE 30 MG/ML
30 INJECTION, SOLUTION INTRAMUSCULAR; INTRAVENOUS EVERY 6 HOURS PRN
Status: ACTIVE | OUTPATIENT
Start: 2024-07-10 | End: 2024-07-15

## 2024-07-10 RX ADMIN — KETOROLAC TROMETHAMINE 30 MG: 30 INJECTION, SOLUTION INTRAMUSCULAR at 18:00

## 2024-07-10 ASSESSMENT — ASTHMA QUESTIONNAIRES
QUESTION_5 LAST FOUR WEEKS HOW WOULD YOU RATE YOUR ASTHMA CONTROL: COMPLETELY CONTROLLED
ACT_TOTALSCORE: 25
ACT_TOTALSCORE: 25
QUESTION_1 LAST FOUR WEEKS HOW MUCH OF THE TIME DID YOUR ASTHMA KEEP YOU FROM GETTING AS MUCH DONE AT WORK, SCHOOL OR AT HOME: NONE OF THE TIME
QUESTION_4 LAST FOUR WEEKS HOW OFTEN HAVE YOU USED YOUR RESCUE INHALER OR NEBULIZER MEDICATION (SUCH AS ALBUTEROL): NOT AT ALL
QUESTION_2 LAST FOUR WEEKS HOW OFTEN HAVE YOU HAD SHORTNESS OF BREATH: NOT AT ALL
QUESTION_3 LAST FOUR WEEKS HOW OFTEN DID YOUR ASTHMA SYMPTOMS (WHEEZING, COUGHING, SHORTNESS OF BREATH, CHEST TIGHTNESS OR PAIN) WAKE YOU UP AT NIGHT OR EARLIER THAN USUAL IN THE MORNING: NOT AT ALL

## 2024-07-10 ASSESSMENT — PATIENT HEALTH QUESTIONNAIRE - PHQ9
SUM OF ALL RESPONSES TO PHQ QUESTIONS 1-9: 0
10. IF YOU CHECKED OFF ANY PROBLEMS, HOW DIFFICULT HAVE THESE PROBLEMS MADE IT FOR YOU TO DO YOUR WORK, TAKE CARE OF THINGS AT HOME, OR GET ALONG WITH OTHER PEOPLE: NOT DIFFICULT AT ALL
SUM OF ALL RESPONSES TO PHQ QUESTIONS 1-9: 0

## 2024-07-10 ASSESSMENT — PAIN SCALES - GENERAL: PAINLEVEL: MODERATE PAIN (4)

## 2024-07-10 NOTE — NURSING NOTE
"Chief Complaint   Patient presents with    Urinary Problem     Patient presents today with frequent urination, pain when urinating, and right side abdominal pain. (Cramping and occasional sharp pains). Patient also mentioned she is prone to utis and kidney stones. She has been taking Tylenol at home.       Initial BP 99/63 (BP Location: Right arm, Patient Position: Sitting, Cuff Size: Adult Regular)   Pulse 66   Temp 98.7  F (37.1  C) (Tympanic)   Resp 16   Ht 1.499 m (4' 11\")   Wt 56.5 kg (124 lb 8 oz)   SpO2 98%   BMI 25.15 kg/m   Estimated body mass index is 25.15 kg/m  as calculated from the following:    Height as of this encounter: 1.499 m (4' 11\").    Weight as of this encounter: 56.5 kg (124 lb 8 oz).     FOOD SECURITY SCREENING QUESTIONS:    The next two questions are to help us understand your food security.  If you are feeling you need any assistance in this area, we have resources available to support you today.    Hunger Vital Signs:  Within the past 12 months we worried whether our food would run out before we got money to buy more. Never  Within the past 12 months the food we bought just didn't last and we didn't have money to get more. Never      [place]    "

## 2024-07-10 NOTE — PROGRESS NOTES
ASSESSMENT/PLAN:    I have reviewed the nursing notes.  I have reviewed the findings, diagnosis, plan and need for follow up with the patient.    1. Abdominal pain in female  2. Urinary problem in female  3. Urinary urgency  4. Urinary frequency    - UA Macroscopic with reflex to Microscopic and Culture-slightly cloudy, mucus present, a few amorphous crystals.  Urinalysis is not indicative of a urinary tract infection therefore a culture will not be completed.    - ketorolac (TORADOL) injection 30 mg- administered in clinic    5. History of kidney stones    - CBC with Platelets & Differential- unremarkable results    - Basic Metabolic Panel- unremarkable results    6. Right flank pain    - CT Abdomen Pelvis w Contrast; Future    - ketorolac (TORADOL) 10 MG tablet; Take 1 tablet (10 mg) by mouth every 6 hours as needed for moderate pain  Dispense: 20 tablet; Refill: 0    - May use over-the-counter Tylenol as needed for mild pain along with the prescribed Toradol for moderate pain    7. Major depressive disorder, single episode, severe without psychotic features (H)    - Patient PHQ-9 depression screening score 0 today.  Patient takes sertraline 50 mg daily.  Denies HI or SI today.    - Discussed warning signs/symptoms indicative of need to f/u    - Follow up if symptoms persist or worsen or concerns    - I explained my diagnostic considerations and recommendations to the patient, who voiced understanding and agreement with the treatment plan. All questions were answered. We discussed potential side effects of any prescribed or recommended therapies, as well as expectations for response to treatments.    AUNG Fuentes CNP  7/10/2024  5:29 PM    HPI:    Lilliana Viera is a 18 year old female who [facility] for concerns of possible UTI.  Patient states for the past few days she has been experiencing urinary urgency and frequency along with right-sided lower abdominal pain and right flank pain.   Patient states that she has history of kidney stones and feels as if she may be in the process of passing stones.  Patient also states that she has not been drinking an adequate amount of water.  Patient denies fever, hematuria, vomiting or diarrhea.  Patient does state that she suffers with constipation from time to time.    Past Medical History:   Diagnosis Date    Closed fracture of phalanx of finger     2015    Migraine without status migrainosus, not intractable     No Comments Provided     Past Surgical History:   Procedure Laterality Date    ARTHROSCOPIC RECONSTRUCTION ANTERIOR CRUCIATE LIGAMENT BONE TENDON BONE AUTOGRAFT Left 02/15/2019    Procedure: Examination Under Anesthesia Left Knee, Left Anterior Cruciate Ligament Reconstruction, Bone Tendon Bone Autograft;  Surgeon: Himanshu Cortez MD;  Location: [facility] OR    DILATION AND CURETTAGE, HYSTEROSCOPY DIAGNOSTIC, COMBINED N/A 1/10/2024    Procedure: HYSTEROSCOPY, DIAGNOSTIC, WITH DILATION AND CURETTAGE OF UTERUS and Insert Intrauterine Device;  Surgeon: Clari Ruiz MD;  Location:  OR     Social History     Tobacco Use    Smoking status: Never     Passive exposure: Never    Smokeless tobacco: Never   Substance Use Topics    Alcohol use: Yes     Comment: occasional     Current Outpatient Medications   Medication Sig Dispense Refill    albuterol (PROAIR HFA/PROVENTIL HFA/VENTOLIN HFA) 108 (90 Base) MCG/ACT inhaler Inhale 2 puffs into the lungs every 4 hours as needed for shortness of breath / dyspnea or wheezing 18 g 1    hydrOXYzine HCl (ATARAX) 25 MG tablet Take 1 tablet (25 mg) by mouth 2 times daily 60 tablet 1    sertraline (ZOLOFT) 50 MG tablet Take 1 tablet (50 mg) by mouth daily 30 tablet 2     No Known Allergies  Past medical history, past surgical history, current medications and allergies reviewed and accurate to the best of my knowledge.      ROS:  Refer to HPI    BP 99/63 (BP Location: Right arm, Patient Position: Sitting, Cuff  "Size: Adult Regular)   Pulse 66   Temp 98.7  F (37.1  C) (Tympanic)   Resp 16   Ht 1.499 m (4' 11\")   Wt 56.5 kg (124 lb 8 oz)   SpO2 98%   BMI 25.15 kg/m      EXAM:  General Appearance: Well appearing 18 year old female, appropriate appearance for age. No acute distress   Respiratory: normal chest wall and respirations.  Normal effort.  Clear to auscultation bilaterally, no wheezing, crackles or rhonchi.  No increased work of breathing.  No cough appreciated.  Cardiac: RRR with no murmurs  Abdomen: soft, nontender, no rigidity, no rebound tenderness or guarding, normal bowel sounds present  :  No suprapubic tenderness to palpation.  Absent CVA tenderness to palpation.    Musculoskeletal:  Equal movement of bilateral upper extremities.  Equal movement of bilateral lower extremities.  Normal gait.    Neuro: Alert and oriented to person, place, and time.   Psychological: normal affect, alert, oriented, and pleasant.     Labs:  Results for orders placed or performed in visit on 07/10/24   UA Macroscopic with reflex to Microscopic and Culture     Status: Abnormal    Specimen: Urine, Clean Catch   Result Value Ref Range    Color Urine Light Yellow Colorless, Straw, Light Yellow, Yellow    Appearance Urine Slightly Cloudy (A) Clear    Glucose Urine Negative Negative mg/dL    Bilirubin Urine Negative Negative    Ketones Urine Negative Negative mg/dL    Specific Gravity Urine 1.018 1.000 - 1.030    Blood Urine Negative Negative    pH Urine 7.0 5.0 - 9.0    Protein Albumin Urine Negative Negative mg/dL    Urobilinogen Urine Normal Normal, 2.0 mg/dL    Nitrite Urine Negative Negative    Leukocyte Esterase Urine Negative Negative    Mucus Urine Present (A) None Seen /LPF    Amorphous Crystals Urine Few (A) None Seen /HPF    RBC Urine <1 <=2 /HPF    WBC Urine 2 <=5 /HPF    Narrative    Urine Culture not indicated   Basic Metabolic Panel     Status: Normal   Result Value Ref Range    Sodium 139 135 - 145 mmol/L    " Potassium 4.0 3.4 - 5.3 mmol/L    Chloride 105 98 - 107 mmol/L    Carbon Dioxide (CO2) 25 22 - 29 mmol/L    Anion Gap 9 7 - 15 mmol/L    Urea Nitrogen 15.5 6.0 - 20.0 mg/dL    Creatinine 0.81 0.51 - 0.95 mg/dL    GFR Estimate >90 >60 mL/min/1.73m2    Calcium 9.8 8.6 - 10.0 mg/dL    Glucose 92 70 - 99 mg/dL   CBC with platelets and differential     Status: None   Result Value Ref Range    WBC Count 7.1 4.0 - 11.0 10e3/uL    RBC Count 4.49 3.80 - 5.20 10e6/uL    Hemoglobin 14.0 11.7 - 15.7 g/dL    Hematocrit 41.0 35.0 - 47.0 %    MCV 91 78 - 100 fL    MCH 31.2 26.5 - 33.0 pg    MCHC 34.1 31.5 - 36.5 g/dL    RDW 12.2 10.0 - 15.0 %    Platelet Count 302 150 - 450 10e3/uL    % Neutrophils 58 %    % Lymphocytes 31 %    % Monocytes 8 %    % Eosinophils 2 %    % Basophils 1 %    % Immature Granulocytes 0 %    NRBCs per 100 WBC 0 <1 /100    Absolute Neutrophils 4.1 1.6 - 8.3 10e3/uL    Absolute Lymphocytes 2.2 0.8 - 5.3 10e3/uL    Absolute Monocytes 0.6 0.0 - 1.3 10e3/uL    Absolute Eosinophils 0.2 0.0 - 0.7 10e3/uL    Absolute Basophils 0.1 0.0 - 0.2 10e3/uL    Absolute Immature Granulocytes 0.0 <=0.4 10e3/uL    Absolute NRBCs 0.0 10e3/uL   CBC with Platelets & Differential     Status: None    Narrative    The following orders were created for panel order CBC with Platelets & Differential.  Procedure                               Abnormality         Status                     ---------                               -----------         ------                     CBC with platelets and d...[[id number]]                      Final result                 Please view results for these tests on the individual orders.     Answers submitted by the patient for this visit:  Patient Health Questionnaire (Submitted on 7/10/2024)  If you checked off any problems, how difficult have these problems made it for you to do your work, take care of things at home, or get along with other people?: Not difficult at all  PHQ9 TOTAL SCORE: 0

## 2024-07-18 ENCOUNTER — MYC REFILL (OUTPATIENT)
Dept: PSYCHIATRY | Facility: OTHER | Age: 19
End: 2024-07-18
Payer: COMMERCIAL

## 2024-07-18 DIAGNOSIS — F32.2 MAJOR DEPRESSIVE DISORDER, SINGLE EPISODE, SEVERE WITHOUT PSYCHOTIC FEATURES (H): ICD-10-CM

## 2024-07-18 DIAGNOSIS — F41.1 GENERALIZED ANXIETY DISORDER: ICD-10-CM

## 2024-07-18 NOTE — TELEPHONE ENCOUNTER
Requested Prescriptions   Pending Prescriptions Disp Refills    sertraline (ZOLOFT) 50 MG tablet 30 tablet 2     Sig: Take 1 tablet (50 mg) by mouth daily       SSRIs Protocol Failed - 7/18/2024  8:37 AM        Failed - SONIDO-7 score of less than 5 in past 6 months.     Please review last SONIDO-7 score.          Last Prescription Date:   4/1/2024  Last Fill Qty/Refills:         30, R-2    Last Office Visit:              4/1/2024 F/U: 3 months or sooner if symptoms occur.   Future Office visit:           None    Henrietta Hampton RN on 7/18/2024 at 2:51 PM

## 2024-07-23 ENCOUNTER — HOSPITAL ENCOUNTER (OUTPATIENT)
Dept: CT IMAGING | Facility: OTHER | Age: 19
Discharge: HOME | End: 2024-07-23
Payer: COMMERCIAL

## 2024-07-23 ENCOUNTER — MYC MEDICAL ADVICE (OUTPATIENT)
Dept: FAMILY MEDICINE | Facility: OTHER | Age: 19
End: 2024-07-23
Payer: COMMERCIAL

## 2024-07-23 DIAGNOSIS — R10.9 RIGHT FLANK PAIN: ICD-10-CM

## 2024-07-23 DIAGNOSIS — Z87.442 HISTORY OF KIDNEY STONES: ICD-10-CM

## 2024-07-23 DIAGNOSIS — R35.0 URINARY FREQUENCY: ICD-10-CM

## 2024-07-23 DIAGNOSIS — R39.15 URINARY URGENCY: ICD-10-CM

## 2024-07-23 DIAGNOSIS — R10.9 ABDOMINAL PAIN IN FEMALE: Primary | ICD-10-CM

## 2024-07-23 DIAGNOSIS — R10.9 ABDOMINAL PAIN IN FEMALE: ICD-10-CM

## 2024-07-23 DIAGNOSIS — K57.30 DIVERTICULOSIS OF LARGE INTESTINE WITHOUT HEMORRHAGE: ICD-10-CM

## 2024-07-23 PROCEDURE — 74176 CT ABD & PELVIS W/O CONTRAST: CPT

## 2024-07-23 NOTE — TELEPHONE ENCOUNTER
Disp Refills Start End AIDE   sertraline (ZOLOFT) 50 MG tablet 30 tablet 2 7/19/2024 -- No   Sig - Route: Take 1 tablet (50 mg) by mouth daily    [facility]  [facility].  Carole Ferrara RN on 7/23/2024 at 9:01 AM

## 2024-08-17 ENCOUNTER — MYC REFILL (OUTPATIENT)
Dept: PSYCHIATRY | Facility: OTHER | Age: 19
End: 2024-08-17
Payer: COMMERCIAL

## 2024-08-17 DIAGNOSIS — F32.2 MAJOR DEPRESSIVE DISORDER, SINGLE EPISODE, SEVERE WITHOUT PSYCHOTIC FEATURES (H): ICD-10-CM

## 2024-08-17 DIAGNOSIS — F41.1 GENERALIZED ANXIETY DISORDER: ICD-10-CM

## 2024-08-20 NOTE — TELEPHONE ENCOUNTER
Disp Refills Start End AIDE   sertraline (ZOLOFT) 50 MG tablet 30 tablet 2 7/19/2024 -- No   Sig - Route: Take 1 tablet (50 mg) by mouth daily   To [facility] in [place].    Patient refill to be sent to [facility] in [facility].  Lost My Name message sent to patient and informed [facility] in [place] can transfer from [facility] in [place].  Carole Ferrara RN on 8/20/2024 at 3:22 PM

## 2024-10-15 DIAGNOSIS — F32.2 MAJOR DEPRESSIVE DISORDER, SINGLE EPISODE, SEVERE WITHOUT PSYCHOTIC FEATURES (H): ICD-10-CM

## 2024-10-15 DIAGNOSIS — F41.1 GENERALIZED ANXIETY DISORDER: ICD-10-CM

## 2024-10-15 NOTE — TELEPHONE ENCOUNTER
[facility] 1984  sent Rx request for the following:      Requested Prescriptions   Pending Prescriptions Disp Refills    sertraline (ZOLOFT) 50 MG tablet [Pharmacy Med Name: Sertraline HCl 50 MG Oral Tablet] 30 tablet 0     Sig: Take 1 tablet by mouth once daily       SSRIs Protocol Failed - 10/15/2024  6:05 AM        Failed - SONIDO-7 score of less than 5 in past 6 months.     Please review last SONIDO-7 score.         Last Prescription Date:   07/19/2024  Last Fill Qty/Refills:         30, R-2  Last Office Visit:              04/01/2024   Future Office visit:           None    Ellen Danielle RN on 10/15/2024 at 2:42 PM

## 2024-11-05 ASSESSMENT — PATIENT HEALTH QUESTIONNAIRE - PHQ9: SUM OF ALL RESPONSES TO PHQ QUESTIONS 1-9: 12

## 2024-11-16 DIAGNOSIS — F41.1 GENERALIZED ANXIETY DISORDER: ICD-10-CM

## 2024-11-16 DIAGNOSIS — F32.2 MAJOR DEPRESSIVE DISORDER, SINGLE EPISODE, SEVERE WITHOUT PSYCHOTIC FEATURES (H): ICD-10-CM

## 2024-11-18 NOTE — TELEPHONE ENCOUNTER
[facility] sent Rx request for the following:      Requested Prescriptions   Pending Prescriptions Disp Refills    sertraline (ZOLOFT) 50 MG tablet [Pharmacy Med Name: Sertraline HCl 50 MG Oral Tablet] 30 tablet 0     Sig: Take 1 tablet by mouth once daily       SSRIs Protocol Failed - 11/18/2024 10:05 AM        Failed - SONIDO-7 score of less than 5 in past 6 months.     Please review last SONIDO-7 score.           Passed - PHQ-9 score less than 5 in past 6 months     Please review last PHQ-9 score.           Passed - Medication is active on med list        Passed - Recent (12 mo) or future (90 days) visit within the authorizing provider's specialty     The patient must have completed an in-person or virtual visit within the past 12 months or has a future visit scheduled within the next 90 days with the authorizing provider s specialty.  Urgent care and e-visits do not quality as an office visit for this protocol.          Passed - Medication indicated for associated diagnosis     Medication is associated with one or more of the following diagnoses:              Anxiety             Bipolar  Depression  Obsessive-compulsive disorder             Panic disorder  Postmenopausal flushing             Premenstrual dysphoric disorder             Social phobia   Adjustment disorder with depressed mood   Mood disorder   Adjustment disorder with anxious mood          Passed - Patient is age 18 or older        Passed - No active pregnancy on record        Passed - No positive pregnancy test in last 12 months             Last Prescription Date:   10/15/24  Last Fill Qty/Refills:         30, R-0    Last Office Visit:              4/1/24   Future Office visit:           None    Unable to complete prescription refill per RN Medication Refill Policy.     Jareht Santos RN on 11/18/2024 at 10:36 AM

## 2024-11-30 ENCOUNTER — HEALTH MAINTENANCE LETTER (OUTPATIENT)
Age: 19
End: 2024-11-30

## 2024-12-28 ENCOUNTER — E-VISIT (OUTPATIENT)
Dept: URGENT CARE | Facility: CLINIC | Age: 19
End: 2024-12-28
Payer: COMMERCIAL

## 2024-12-28 DIAGNOSIS — R21 RASH: Primary | ICD-10-CM

## 2024-12-28 PROCEDURE — 99207 PR NON-BILLABLE SERV PER CHARTING: CPT | Performed by: NURSE PRACTITIONER

## 2025-02-09 ENCOUNTER — E-VISIT (OUTPATIENT)
Dept: URGENT CARE | Facility: CLINIC | Age: 20
End: 2025-02-09
Payer: COMMERCIAL

## 2025-02-09 DIAGNOSIS — R30.0 DYSURIA: Primary | ICD-10-CM

## 2025-02-09 NOTE — PATIENT INSTRUCTIONS
Dear Lilliana Viera,     After reviewing your responses, I would like you to come in for a urine test to make sure we treat you correctly. This urine test is to evaluate you for a possible urinary tract infection, and should be scheduled for today or tomorrow. Schedule a Lab Only appointment here.     Lab appointments are not available at most locations on the weekends. If no Lab Only appointment is available, you should be seen in any of our convenient Walk-in or Urgent Care Centers, which can be found on our website here.     You will receive instructions with your results in [email or web address] once they are available.     If your symptoms worsen, you develop pain in your back or stomach, develop fevers, or are not improving in 5 days, please contact your primary care provider for an appointment or visit a Walk-in or Urgent Care Center to be seen.     Thanks again for choosing us as your health care partner,     AUNG SANCHEZ CNP

## 2025-02-22 ENCOUNTER — E-VISIT (OUTPATIENT)
Dept: URGENT CARE | Facility: CLINIC | Age: 20
End: 2025-02-22
Payer: COMMERCIAL

## 2025-02-22 DIAGNOSIS — R30.0 DYSURIA: Primary | ICD-10-CM

## 2025-02-23 NOTE — PATIENT INSTRUCTIONS
Dear Lilliana Viera,     After reviewing your responses, I would like you to come in for a urine test to make sure we treat you correctly. This urine test is to evaluate you for a possible urinary tract infection, and should be scheduled for today or tomorrow. Schedule a Lab Only appointment here.  Or you can walk into any open [place] clinic today and tell the  you have a urine test ordered and the lab will collect the urine sample.      Lab appointments are not available at most locations on the weekends. If no Lab Only appointment is available, you should be seen in any of our convenient Walk-in or Urgent Care Centers, which can be found on our website here.     You will receive instructions with your results in NewsBreak once they are available.     If your symptoms worsen, you develop pain in your back or stomach, develop fevers, or are not improving in 5 days, please contact your primary care provider for an appointment or visit a Walk-in or Urgent Care Center to be seen.     Thanks again for choosing us as your health care partner,     Elzbieta Pepper MD

## 2025-03-29 DIAGNOSIS — F41.1 GENERALIZED ANXIETY DISORDER: ICD-10-CM

## 2025-03-29 DIAGNOSIS — F32.2 MAJOR DEPRESSIVE DISORDER, SINGLE EPISODE, SEVERE WITHOUT PSYCHOTIC FEATURES (H): ICD-10-CM

## 2025-03-31 NOTE — TELEPHONE ENCOUNTER
[facility] [place] sent Rx request for the following:      Requested Prescriptions   Pending Prescriptions Disp Refills    sertraline (ZOLOFT) 50 MG tablet [Pharmacy Med Name: Sertraline HCl 50 MG Oral Tablet] 30 tablet 0     Sig: Take 1 tablet by mouth once daily       SSRIs Protocol Failed - 3/31/2025  4:07 PM        Failed - PHQ-9 score less than 5 in past 6 months     Please review last PHQ-9 score.       2/29/2024     3:58 PM 4/1/2024     3:14 PM 7/10/2024     5:24 PM   PHQ-9 SCORE   PHQ-9 Total Score MyChart 16 (Moderately severe depression) 10 (Moderate depression) 0   PHQ-9 Total Score 16 10 0             Failed - SONIDO-7 score of less than 5 in past 6 months.     Please review last SONIDO-7 score.       2/28/2024     8:41 PM 2/29/2024     3:58 PM 4/1/2024     3:15 PM   SONIDO-7 SCORE   Total Score 16 (severe anxiety) 16 (severe anxiety) 11 (moderate anxiety)   Total Score 16 16 11            Last Prescription Date:   2/28/25  Last Fill Qty/Refills:         30, R-0    Last Office Visit:              4/1/24   Future Office visit:           none    Routing refill request to provider for review/approval because:  Drug not on the [facility] refill protocol     Judy Zavaleta RN on 3/31/2025 at 4:08 PM

## 2025-05-03 DIAGNOSIS — F32.2 MAJOR DEPRESSIVE DISORDER, SINGLE EPISODE, SEVERE WITHOUT PSYCHOTIC FEATURES (H): ICD-10-CM

## 2025-05-03 DIAGNOSIS — F41.1 GENERALIZED ANXIETY DISORDER: ICD-10-CM

## 2025-06-07 DIAGNOSIS — F32.2 MAJOR DEPRESSIVE DISORDER, SINGLE EPISODE, SEVERE WITHOUT PSYCHOTIC FEATURES (H): ICD-10-CM

## 2025-06-07 DIAGNOSIS — F41.1 GENERALIZED ANXIETY DISORDER: ICD-10-CM

## 2025-06-10 NOTE — TELEPHONE ENCOUNTER
[facility] [place] sent Rx request for the following:      Requested Prescriptions   Pending Prescriptions Disp Refills    sertraline (ZOLOFT) 50 MG tablet [Pharmacy Med Name: Sertraline HCl 50 MG Oral Tablet] 30 tablet 0     Sig: Take 1 tablet by mouth once daily       SSRIs Protocol Failed - 6/10/2025  9:10 AM        Failed - PHQ-9 score less than 5 in past 6 months     Please review last PHQ-9 score.       4/1/2024     3:14 PM 7/10/2024     5:24 PM 6/2/2025    12:06 PM   PHQ-9 SCORE   PHQ-9 Total Score MyChart 10 (Moderate depression) 0 8 (Mild depression)   PHQ-9 Total Score 10 0 8        Patient-reported             Failed - SONIDO-7 score of less than 5 in past 6 months.     Please review last SONIDO-7 score.       2/29/2024     3:58 PM 4/1/2024     3:15 PM 6/2/2025    12:06 PM   SONIDO-7 SCORE   Total Score 16 (severe anxiety) 11 (moderate anxiety) 9 (mild anxiety)   Total Score 16 11 9        Patient-reported            Last Prescription Date:   5/5/25  Last Fill Qty/Refills:         30, R-0    Last Office Visit:              4/1/24   Future Office visit:           6/17/25    Routing refill request to provider for review/approval because:  Drug not on the [facility] refill protocol     Judy Zavaleta RN on 6/10/2025 at 9:12 AM

## 2025-06-17 ENCOUNTER — OFFICE VISIT (OUTPATIENT)
Dept: PSYCHIATRY | Facility: OTHER | Age: 20
End: 2025-06-17
Payer: COMMERCIAL

## 2025-06-17 ENCOUNTER — MYC MEDICAL ADVICE (OUTPATIENT)
Dept: PSYCHIATRY | Facility: OTHER | Age: 20
End: 2025-06-17
Payer: COMMERCIAL

## 2025-06-17 VITALS
RESPIRATION RATE: 16 BRPM | DIASTOLIC BLOOD PRESSURE: 65 MMHG | OXYGEN SATURATION: 98 % | HEART RATE: 75 BPM | TEMPERATURE: 98.5 F | SYSTOLIC BLOOD PRESSURE: 103 MMHG | WEIGHT: 139.6 LBS | BODY MASS INDEX: 28.2 KG/M2

## 2025-06-17 DIAGNOSIS — F41.1 GENERALIZED ANXIETY DISORDER: ICD-10-CM

## 2025-06-17 DIAGNOSIS — F32.0 CURRENT MILD EPISODE OF MAJOR DEPRESSIVE DISORDER WITHOUT PRIOR EPISODE: Primary | ICD-10-CM

## 2025-06-17 RX ORDER — BUPROPION HYDROCHLORIDE 150 MG/1
150 TABLET ORAL EVERY MORNING
Qty: 30 TABLET | Refills: 1 | Status: SHIPPED | OUTPATIENT
Start: 2025-06-17

## 2025-06-17 ASSESSMENT — ANXIETY QUESTIONNAIRES
3. WORRYING TOO MUCH ABOUT DIFFERENT THINGS: SEVERAL DAYS
IF YOU CHECKED OFF ANY PROBLEMS ON THIS QUESTIONNAIRE, HOW DIFFICULT HAVE THESE PROBLEMS MADE IT FOR YOU TO DO YOUR WORK, TAKE CARE OF THINGS AT HOME, OR GET ALONG WITH OTHER PEOPLE: SOMEWHAT DIFFICULT
8. IF YOU CHECKED OFF ANY PROBLEMS, HOW DIFFICULT HAVE THESE MADE IT FOR YOU TO DO YOUR WORK, TAKE CARE OF THINGS AT HOME, OR GET ALONG WITH OTHER PEOPLE?: SOMEWHAT DIFFICULT
7. FEELING AFRAID AS IF SOMETHING AWFUL MIGHT HAPPEN: NOT AT ALL
5. BEING SO RESTLESS THAT IT IS HARD TO SIT STILL: NOT AT ALL
GAD7 TOTAL SCORE: 6
6. BECOMING EASILY ANNOYED OR IRRITABLE: SEVERAL DAYS
4. TROUBLE RELAXING: MORE THAN HALF THE DAYS
GAD7 TOTAL SCORE: 6
1. FEELING NERVOUS, ANXIOUS, OR ON EDGE: SEVERAL DAYS
7. FEELING AFRAID AS IF SOMETHING AWFUL MIGHT HAPPEN: NOT AT ALL
GAD7 TOTAL SCORE: 6
2. NOT BEING ABLE TO STOP OR CONTROL WORRYING: SEVERAL DAYS

## 2025-06-17 ASSESSMENT — PAIN SCALES - GENERAL: PAINLEVEL_OUTOF10: NO PAIN (0)

## 2025-06-17 NOTE — NURSING NOTE
"Chief Complaint   Patient presents with    Medication Follow-up       Initial /65 (BP Location: Left arm, Patient Position: Sitting, Cuff Size: Adult Regular)   Pulse 75   Temp 98.5  F (36.9  C) (Tympanic)   Resp 16   Wt 63.3 kg (139 lb 9.6 oz)   SpO2 98%   BMI 28.20 kg/m   Estimated body mass index is 28.2 kg/m  as calculated from the following:    Height as of 7/10/24: 1.499 m (4' 11\").    Weight as of this encounter: 63.3 kg (139 lb 9.6 oz).  Medication Review: complete    The next two questions are to help us understand your food security.  If you are feeling you need any assistance in this area, we have resources available to support you today.          6/17/2025   SDOH- Food Insecurity   Within the past 12 months, did you worry that your food would run out before you got money to buy more? N   Within the past 12 months, did the food you bought just not last and you didn t have money to get more? N         Health Care Directive:  Patient does not have a Health Care Directive: Discussed advance care planning with patient; however, patient declined at this time.    Emily Fonseca CNA      "
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
## 2025-07-03 NOTE — NURSING NOTE
Patient requesting call from nurse would not give reason why  
Pt presents to clinic today for a follow up and recheck of STD/BV testing. Pt states she has no symptoms.   Has been having left hip pain for 2 years now. Nothing is helping the pain. She has seen PTand chiropractor.       FOOD SECURITY SCREENING QUESTIONS:    The next two questions are to help us understand your food security.  If you are feeling you need any assistance in this area, we have resources available to support you today.    Hunger Vital Signs:  Within the past 12 months we worried whether our food would run out before we got money to buy more. Never  Within the past 12 months the food we bought just didn't last and we didn't have money to get more. Never          Medication Reconciliation: complete  Trung Santiago LPN,LPN on 4/21/2023 at 8:58 AM   
Prescription called to pharmacy

## 2025-07-14 NOTE — NURSING NOTE
Immunization Documentation    Prior to Immunization administration, verified patients identity using patient's name and date of birth. Please see IMMUNIZATIONS  and order for additional information.  Patient / Parent instructed to remain in clinic for 15 minutes and report any adverse reaction to staff immediately.    Was entire vial of medication used? Yes  Vial/Syringe: Syringe    Clari Ferrara LPN  8/7/2019   6:48 PM     The patient is a 35y Female complaining of abscess.

## 2025-07-29 ENCOUNTER — OFFICE VISIT (OUTPATIENT)
Dept: PSYCHIATRY | Facility: OTHER | Age: 20
End: 2025-07-29
Payer: COMMERCIAL

## 2025-07-29 VITALS
HEART RATE: 82 BPM | SYSTOLIC BLOOD PRESSURE: 102 MMHG | OXYGEN SATURATION: 97 % | TEMPERATURE: 98.3 F | BODY MASS INDEX: 26.58 KG/M2 | DIASTOLIC BLOOD PRESSURE: 70 MMHG | RESPIRATION RATE: 16 BRPM | WEIGHT: 131.6 LBS

## 2025-07-29 DIAGNOSIS — G47.00 INSOMNIA, UNSPECIFIED TYPE: ICD-10-CM

## 2025-07-29 DIAGNOSIS — F32.0 CURRENT MILD EPISODE OF MAJOR DEPRESSIVE DISORDER WITHOUT PRIOR EPISODE: Primary | ICD-10-CM

## 2025-07-29 PROBLEM — F32.2 MAJOR DEPRESSIVE DISORDER, SINGLE EPISODE, SEVERE WITHOUT PSYCHOTIC FEATURES (H): Status: RESOLVED | Noted: 2022-02-09 | Resolved: 2025-07-29

## 2025-07-29 PROBLEM — F41.9 ANXIETY: Status: RESOLVED | Noted: 2022-02-09 | Resolved: 2025-07-29

## 2025-07-29 RX ORDER — TRAZODONE HYDROCHLORIDE 50 MG/1
25-50 TABLET ORAL
Qty: 30 TABLET | Refills: 2 | Status: SHIPPED | OUTPATIENT
Start: 2025-07-29

## 2025-07-29 ASSESSMENT — ANXIETY QUESTIONNAIRES
GAD7 TOTAL SCORE: 8
4. TROUBLE RELAXING: MORE THAN HALF THE DAYS
5. BEING SO RESTLESS THAT IT IS HARD TO SIT STILL: NOT AT ALL
8. IF YOU CHECKED OFF ANY PROBLEMS, HOW DIFFICULT HAVE THESE MADE IT FOR YOU TO DO YOUR WORK, TAKE CARE OF THINGS AT HOME, OR GET ALONG WITH OTHER PEOPLE?: SOMEWHAT DIFFICULT
GAD7 TOTAL SCORE: 8
7. FEELING AFRAID AS IF SOMETHING AWFUL MIGHT HAPPEN: NOT AT ALL
6. BECOMING EASILY ANNOYED OR IRRITABLE: SEVERAL DAYS
1. FEELING NERVOUS, ANXIOUS, OR ON EDGE: SEVERAL DAYS
2. NOT BEING ABLE TO STOP OR CONTROL WORRYING: MORE THAN HALF THE DAYS
IF YOU CHECKED OFF ANY PROBLEMS ON THIS QUESTIONNAIRE, HOW DIFFICULT HAVE THESE PROBLEMS MADE IT FOR YOU TO DO YOUR WORK, TAKE CARE OF THINGS AT HOME, OR GET ALONG WITH OTHER PEOPLE: SOMEWHAT DIFFICULT
3. WORRYING TOO MUCH ABOUT DIFFERENT THINGS: MORE THAN HALF THE DAYS
GAD7 TOTAL SCORE: 8
7. FEELING AFRAID AS IF SOMETHING AWFUL MIGHT HAPPEN: NOT AT ALL

## 2025-07-29 ASSESSMENT — PATIENT HEALTH QUESTIONNAIRE - PHQ9
SUM OF ALL RESPONSES TO PHQ QUESTIONS 1-9: 10
10. IF YOU CHECKED OFF ANY PROBLEMS, HOW DIFFICULT HAVE THESE PROBLEMS MADE IT FOR YOU TO DO YOUR WORK, TAKE CARE OF THINGS AT HOME, OR GET ALONG WITH OTHER PEOPLE: SOMEWHAT DIFFICULT
SUM OF ALL RESPONSES TO PHQ QUESTIONS 1-9: 10

## 2025-07-29 ASSESSMENT — PAIN SCALES - GENERAL: PAINLEVEL_OUTOF10: NO PAIN (0)
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
## 2025-07-29 NOTE — NURSING NOTE
"Chief Complaint   Patient presents with    Medication Follow-up       Initial /70 (BP Location: Left arm, Patient Position: Sitting, Cuff Size: Adult Regular)   Pulse 82   Temp 98.3  F (36.8  C) (Tympanic)   Resp 16   Wt 59.7 kg (131 lb 9.6 oz)   SpO2 97%   BMI 26.58 kg/m   Estimated body mass index is 26.58 kg/m  as calculated from the following:    Height as of 7/10/24: 1.499 m (4' 11\").    Weight as of this encounter: 59.7 kg (131 lb 9.6 oz).  Medication Review: complete    The next two questions are to help us understand your food security.  If you are feeling you need any assistance in this area, we have resources available to support you today.          6/17/2025   SDOH- Food Insecurity   Within the past 12 months, did you worry that your food would run out before you got money to buy more? N    N   Within the past 12 months, did the food you bought just not last and you didn t have money to get more? N    N       Multiple values from one day are sorted in reverse-chronological order         Health Care Directive:  Patient does not have a Health Care Directive: Discussed advance care planning with patient; however, patient declined at this time.    Emily Fonseca CNA      "

## (undated) DEVICE — KIT PROCEDURE FLUENT IN/OUT FLOWPAK TISS TRAP FLT-112S

## (undated) DEVICE — LINEN ORTHO PACK 5446

## (undated) DEVICE — GLOVE PROTEXIS BLUE W/NEU-THERA 8.0  2D73EB80

## (undated) DEVICE — DRSG TELFA 3X8" 1238

## (undated) DEVICE — SU LOOP #2 TIGERLOOP AR-7234T

## (undated) DEVICE — SU VICRYL 2-0 CT-1 27" UND J259H

## (undated) DEVICE — DRSG STERI STRIP 1/2X4" R1547

## (undated) DEVICE — GLOVE PROTEXIS POWDER FREE SMT 6.5  2D72PT65X

## (undated) DEVICE — SEAL SET MYOSURE ROD LENS SCOPE SINGLE USE 40-902

## (undated) DEVICE — TUBING SUCTION MEDI-VAC 1/4"X20' N620A

## (undated) DEVICE — PACK ARTHROSCOPY CUSTOM ASC

## (undated) DEVICE — DECANTER VIAL 2006S

## (undated) DEVICE — ABLATOR ARTHREX APOLLO RF MP90 ASPIRATING 90DEG AR-9811

## (undated) DEVICE — PAD CHUX UNDERPAD 30X36" P3036C

## (undated) DEVICE — TUBING SYSTEM ARTHREX PATIENT REDEUCE AR-6421

## (undated) DEVICE — PAD PERI INDIV WRAP 11" 2022A

## (undated) DEVICE — LINEN GOWN XLG 5407

## (undated) DEVICE — SU FIBERWIRE 2 38"  AR-7200

## (undated) DEVICE — PREP DURAPREP REMOVER 4OZ 8611

## (undated) DEVICE — SU MONOCRYL 3-0 PS-1 27" Y936H

## (undated) DEVICE — PREP DURAPREP 26ML APL 8630

## (undated) DEVICE — ESU PENCIL SMOKE EVAC W/ROCKER SWITCH 0703-047-000

## (undated) DEVICE — SYR 10ML FINGER CONTROL W/O NDL 309695

## (undated) DEVICE — SLEEVE COMPRESSION SCD KNEE MED 74022

## (undated) DEVICE — ESU GROUND PAD ADULT W/CORD E7507

## (undated) DEVICE — PAD ARMBOARD FOAM EGGCRATE COVIDEN 3114367

## (undated) DEVICE — GLOVE PROTEXIS POWDER FREE SMT 8.0  2D72PT80X

## (undated) DEVICE — Device

## (undated) DEVICE — BLADE SAW OSCILLATING STRK MICRO 9X10X0.51MM 2296-033-220

## (undated) DEVICE — SUCTION MANIFOLD NEPTUNE 2 SYS 4 PORT 0702-020-000

## (undated) DEVICE — PEN MARKING SKIN W/PAPER RULER 31145785

## (undated) DEVICE — BUR ARTHREX COOLCUT SABRE 4.0MMX13CM AR-8400SR

## (undated) DEVICE — SOL NACL 0.9% IRRIG 3000ML BAG 2B7477

## (undated) DEVICE — GLOVE BIOGEL 7 LATEX

## (undated) DEVICE — NDL SPINAL 20GA 3.5" 405182

## (undated) DEVICE — SOL WATER 1500ML

## (undated) DEVICE — PACK ACL SUPPLEMENT CUSTOM ASC

## (undated) DEVICE — PACK LITHOTOMY SBA15LIFCA

## (undated) DEVICE — SU ETHIBOND 1 CT-1 30" X425H

## (undated) RX ORDER — PROPOFOL 10 MG/ML
INJECTION, EMULSION INTRAVENOUS
Status: DISPENSED
Start: 2019-02-15

## (undated) RX ORDER — DIPHENHYDRAMINE HYDROCHLORIDE 50 MG/ML
INJECTION INTRAMUSCULAR; INTRAVENOUS
Status: DISPENSED
Start: 2020-03-01

## (undated) RX ORDER — PROPOFOL 10 MG/ML
INJECTION, EMULSION INTRAVENOUS
Status: DISPENSED
Start: 2024-01-10

## (undated) RX ORDER — METOCLOPRAMIDE HYDROCHLORIDE 5 MG/ML
INJECTION INTRAMUSCULAR; INTRAVENOUS
Status: DISPENSED
Start: 2020-03-01

## (undated) RX ORDER — CEFAZOLIN SODIUM 2 G/50ML
SOLUTION INTRAVENOUS
Status: DISPENSED
Start: 2019-02-15

## (undated) RX ORDER — FENTANYL CITRATE 50 UG/ML
INJECTION, SOLUTION INTRAMUSCULAR; INTRAVENOUS
Status: DISPENSED
Start: 2019-02-15

## (undated) RX ORDER — GABAPENTIN 300 MG/1
CAPSULE ORAL
Status: DISPENSED
Start: 2019-02-15

## (undated) RX ORDER — SODIUM CHLORIDE 9 MG/ML
INJECTION, SOLUTION INTRAVENOUS
Status: DISPENSED
Start: 2020-03-01

## (undated) RX ORDER — ONDANSETRON 2 MG/ML
INJECTION INTRAMUSCULAR; INTRAVENOUS
Status: DISPENSED
Start: 2019-02-15

## (undated) RX ORDER — BUPIVACAINE HYDROCHLORIDE 2.5 MG/ML
INJECTION, SOLUTION EPIDURAL; INFILTRATION; INTRACAUDAL
Status: DISPENSED
Start: 2024-01-10

## (undated) RX ORDER — DEXAMETHASONE SODIUM PHOSPHATE 4 MG/ML
INJECTION, SOLUTION INTRA-ARTICULAR; INTRALESIONAL; INTRAMUSCULAR; INTRAVENOUS; SOFT TISSUE
Status: DISPENSED
Start: 2024-01-10

## (undated) RX ORDER — ONDANSETRON 2 MG/ML
INJECTION INTRAMUSCULAR; INTRAVENOUS
Status: DISPENSED
Start: 2020-03-01

## (undated) RX ORDER — FENTANYL CITRATE 50 UG/ML
INJECTION, SOLUTION INTRAMUSCULAR; INTRAVENOUS
Status: DISPENSED
Start: 2024-01-10

## (undated) RX ORDER — KETOROLAC TROMETHAMINE 30 MG/ML
INJECTION, SOLUTION INTRAMUSCULAR; INTRAVENOUS
Status: DISPENSED
Start: 2024-07-10

## (undated) RX ORDER — DEXAMETHASONE SODIUM PHOSPHATE 4 MG/ML
INJECTION, SOLUTION INTRA-ARTICULAR; INTRALESIONAL; INTRAMUSCULAR; INTRAVENOUS; SOFT TISSUE
Status: DISPENSED
Start: 2019-02-15

## (undated) RX ORDER — KETOROLAC TROMETHAMINE 30 MG/ML
INJECTION, SOLUTION INTRAMUSCULAR; INTRAVENOUS
Status: DISPENSED
Start: 2024-01-10

## (undated) RX ORDER — GABAPENTIN 100 MG/1
CAPSULE ORAL
Status: DISPENSED
Start: 2019-02-15

## (undated) RX ORDER — OXYCODONE HYDROCHLORIDE 5 MG/1
TABLET ORAL
Status: DISPENSED
Start: 2019-02-15

## (undated) RX ORDER — ONDANSETRON 2 MG/ML
INJECTION INTRAMUSCULAR; INTRAVENOUS
Status: DISPENSED
Start: 2024-01-10

## (undated) RX ORDER — KETOROLAC TROMETHAMINE 30 MG/ML
INJECTION, SOLUTION INTRAMUSCULAR; INTRAVENOUS
Status: DISPENSED
Start: 2020-03-01

## (undated) RX ORDER — ACETAMINOPHEN 325 MG/1
TABLET ORAL
Status: DISPENSED
Start: 2019-02-15